# Patient Record
Sex: FEMALE | Race: WHITE | NOT HISPANIC OR LATINO
[De-identification: names, ages, dates, MRNs, and addresses within clinical notes are randomized per-mention and may not be internally consistent; named-entity substitution may affect disease eponyms.]

---

## 2017-03-05 ENCOUNTER — RESULT REVIEW (OUTPATIENT)
Age: 27
End: 2017-03-05

## 2018-02-14 ENCOUNTER — TRANSCRIPTION ENCOUNTER (OUTPATIENT)
Age: 28
End: 2018-02-14

## 2018-04-04 ENCOUNTER — RESULT REVIEW (OUTPATIENT)
Age: 28
End: 2018-04-04

## 2018-06-26 ENCOUNTER — APPOINTMENT (OUTPATIENT)
Dept: PSYCHIATRY | Facility: CLINIC | Age: 28
End: 2018-06-26

## 2019-04-03 ENCOUNTER — RESULT REVIEW (OUTPATIENT)
Age: 29
End: 2019-04-03

## 2020-12-22 ENCOUNTER — TRANSCRIPTION ENCOUNTER (OUTPATIENT)
Age: 30
End: 2020-12-22

## 2021-01-04 ENCOUNTER — RESULT REVIEW (OUTPATIENT)
Age: 31
End: 2021-01-04

## 2022-04-25 ENCOUNTER — RESULT REVIEW (OUTPATIENT)
Age: 32
End: 2022-04-25

## 2022-12-09 ENCOUNTER — ASOB RESULT (OUTPATIENT)
Age: 32
End: 2022-12-09

## 2022-12-09 ENCOUNTER — TRANSCRIPTION ENCOUNTER (OUTPATIENT)
Age: 32
End: 2022-12-09

## 2022-12-09 ENCOUNTER — APPOINTMENT (OUTPATIENT)
Dept: ANTEPARTUM | Facility: CLINIC | Age: 32
End: 2022-12-09

## 2022-12-09 PROCEDURE — 76811 OB US DETAILED SNGL FETUS: CPT

## 2023-03-31 ENCOUNTER — APPOINTMENT (OUTPATIENT)
Dept: ANTEPARTUM | Facility: CLINIC | Age: 33
End: 2023-03-31

## 2023-04-03 ENCOUNTER — OUTPATIENT (OUTPATIENT)
Dept: OUTPATIENT SERVICES | Facility: HOSPITAL | Age: 33
LOS: 1 days | End: 2023-04-03
Payer: COMMERCIAL

## 2023-04-03 VITALS
RESPIRATION RATE: 20 BRPM | OXYGEN SATURATION: 98 % | HEIGHT: 67 IN | DIASTOLIC BLOOD PRESSURE: 77 MMHG | HEART RATE: 87 BPM | SYSTOLIC BLOOD PRESSURE: 108 MMHG | WEIGHT: 162.92 LBS | TEMPERATURE: 98 F

## 2023-04-03 DIAGNOSIS — Z98.890 OTHER SPECIFIED POSTPROCEDURAL STATES: Chronic | ICD-10-CM

## 2023-04-03 DIAGNOSIS — Z01.818 ENCOUNTER FOR OTHER PREPROCEDURAL EXAMINATION: ICD-10-CM

## 2023-04-03 DIAGNOSIS — Z29.9 ENCOUNTER FOR PROPHYLACTIC MEASURES, UNSPECIFIED: ICD-10-CM

## 2023-04-03 LAB
BLD GP AB SCN SERPL QL: NEGATIVE — SIGNIFICANT CHANGE UP
HCT VFR BLD CALC: 35.8 % — SIGNIFICANT CHANGE UP (ref 34.5–45)
HGB BLD-MCNC: 12.1 G/DL — SIGNIFICANT CHANGE UP (ref 11.5–15.5)
MCHC RBC-ENTMCNC: 33.1 PG — SIGNIFICANT CHANGE UP (ref 27–34)
MCHC RBC-ENTMCNC: 33.8 GM/DL — SIGNIFICANT CHANGE UP (ref 32–36)
MCV RBC AUTO: 97.8 FL — SIGNIFICANT CHANGE UP (ref 80–100)
NRBC # BLD: 0 /100 WBCS — SIGNIFICANT CHANGE UP (ref 0–0)
PLATELET # BLD AUTO: 177 K/UL — SIGNIFICANT CHANGE UP (ref 150–400)
RBC # BLD: 3.66 M/UL — LOW (ref 3.8–5.2)
RBC # FLD: 12.3 % — SIGNIFICANT CHANGE UP (ref 10.3–14.5)
RH IG SCN BLD-IMP: POSITIVE — SIGNIFICANT CHANGE UP
WBC # BLD: 8.12 K/UL — SIGNIFICANT CHANGE UP (ref 3.8–10.5)
WBC # FLD AUTO: 8.12 K/UL — SIGNIFICANT CHANGE UP (ref 3.8–10.5)

## 2023-04-03 PROCEDURE — 86901 BLOOD TYPING SEROLOGIC RH(D): CPT

## 2023-04-03 PROCEDURE — 86850 RBC ANTIBODY SCREEN: CPT

## 2023-04-03 PROCEDURE — 86900 BLOOD TYPING SEROLOGIC ABO: CPT

## 2023-04-03 PROCEDURE — G0463: CPT

## 2023-04-03 PROCEDURE — 85027 COMPLETE CBC AUTOMATED: CPT

## 2023-04-03 RX ORDER — CEFAZOLIN SODIUM 1 G
2000 VIAL (EA) INJECTION ONCE
Refills: 0 | Status: COMPLETED | OUTPATIENT
Start: 2023-04-17 | End: 2023-04-17

## 2023-04-03 NOTE — OB PST NOTE - NSANTHOSAYNRD_GEN_A_CORE
No. MARTINEZ screening performed.  STOP BANG Legend: 0-2 = LOW Risk; 3-4 = INTERMEDIATE Risk; 5-8 = HIGH Risk

## 2023-04-03 NOTE — OB PST NOTE - PATIENT ON (OXYGEN DELIVERY METHOD)
As discussed in clinic today:    Lab work and Abdominal X-ray today: We will call you with the results   - Lab work: review after allergy results    - Abdominal X-ray is completed on the Lobby floor in this building at outpatient registration.      Medications:   Start taking IBD- Jaclyn- peppermint tea/oil: OTC  Start taking Bentyl- 10mg up to four times a day AS Needed      Stool study- fecal leon-protectin: checks for inflammation in the colon      BRAT diet  FODMAP handout    School note today room air

## 2023-04-03 NOTE — OB PST NOTE - PROBLEM SELECTOR PLAN 1
Primary  on 2023.  Chlorhexidine wash give Pre-Op  Instructed to drink Gatorade 20 oz , to be completed 2 hrs pre-op

## 2023-04-03 NOTE — OB PST NOTE - FALL HARM RISK - UNIVERSAL INTERVENTIONS
Bed in lowest position, wheels locked, appropriate side rails in place/Call bell, personal items and telephone in reach/Instruct patient to call for assistance before getting out of bed or chair/Non-slip footwear when patient is out of bed/Tarzan to call system/Physically safe environment - no spills, clutter or unnecessary equipment/Purposeful Proactive Rounding/Room/bathroom lighting operational, light cord in reach

## 2023-04-03 NOTE — OB PST NOTE - BSA (M2)
Medical screening exam completed.  I have conducted a focused provider triage encounter, findings are as follows:    Brief history of present illness:  Headache after basketball hit her in the head. No pmh.     Vitals:    04/14/22 1055   BP: (!) 147/65   BP Location: Right arm   Patient Position: Sitting   Pulse: (!) 58   Resp: 18   Temp: 98.1 °F (36.7 °C)   TempSrc: Oral   SpO2: 98%   Weight: 88.5 kg (195 lb)       Pertinent physical exam:  Ambulatry. na    Brief workup plan:  Meds. reassessment    Preliminary workup initiated; this workup will be continued and followed by the physician or advanced practice provider that is assigned to the patient when roomed.  
1.85

## 2023-04-03 NOTE — OB PST NOTE - ASSESSMENT
LORENZOI VTE 2.0 SCORE [CLOT updated 2019]    AGE RELATED RISK FACTORS                                                       MOBILITY RELATED FACTORS  [ ] Age 41-60 years                                            (1 Point)                    [ ] Bed rest                                                        (1 Point)  [ ] Age: 61-74 years                                           (2 Points)                  [ ] Plaster cast                                                   (2 Points)  [ ] Age= 75 years                                              (3 Points)                    [ ] Bed bound for more than 72 hours                 (2 Points)    DISEASE RELATED RISK FACTORS                                               GENDER SPECIFIC FACTORS  [ ] Edema in the lower extremities                       (1 Point)              [ ] Pregnancy                                                     (1 Point)  [ ] Varicose veins                                               (1 Point)                     [ ] Post-partum < 6 weeks                                   (1 Point)             [ ] BMI > 25 Kg/m2                                            (1 Point)                     [ ] Hormonal therapy  or oral contraception          (1 Point)                 [ ] Sepsis (in the previous month)                        (1 Point)               [ ] History of pregnancy complications                 (1 point)  [ ] Pneumonia or serious lung disease                                               [ ] Unexplained or recurrent                     (1 Point)           (in the previous month)                               (1 Point)  [ ] Abnormal pulmonary function test                     (1 Point)                 SURGERY RELATED RISK FACTORS  [ ] Acute myocardial infarction                              (1 Point)               [ ]  Section                                             (1 Point)  [ ] Congestive heart failure (in the previous month)  (1 Point)      [ ] Minor surgery                                                  (1 Point)   [ ] Inflammatory bowel disease                             (1 Point)               [ ] Arthroscopic surgery                                        (2 Points)  [ ] Central venous access                                      (2 Points)                [ ] General surgery lasting more than 45 minutes (2 points)  [ ] Malignancy- Present or previous                   (2 Points)                [ ] Elective arthroplasty                                         (5 points)    [ ] Stroke (in the previous month)                          (5 Points)                                                                                                                                                           HEMATOLOGY RELATED FACTORS                                                 TRAUMA RELATED RISK FACTORS  [ ] Prior episodes of VTE                                     (3 Points)                [ ] Fracture of the hip, pelvis, or leg                       (5 Points)  [ ] Positive family history for VTE                         (3 Points)             [ ] Acute spinal cord injury (in the previous month)  (5 Points)  [ ] Prothrombin 29009 A                                     (3 Points)               [ ] Paralysis  (less than 1 month)                             (5 Points)  [ ] Factor V Leiden                                             (3 Points)                  [ ] Multiple Trauma within 1 month                        (5 Points)  [ ] Lupus anticoagulants                                     (3 Points)                                                           [ ] Anticardiolipin antibodies                               (3 Points)                                                       [ ] High homocysteine in the blood                      (3 Points)                                             [ ] Other congenital or acquired thrombophilia      (3 Points)                                                [ ] Heparin induced thrombocytopenia                  (3 Points)                                     Total Score [          ] LORENZOI VTE 2.0 SCORE [CLOT updated 2019]    AGE RELATED RISK FACTORS                                                       MOBILITY RELATED FACTORS  [ ] Age 41-60 years                                            (1 Point)                    [ ] Bed rest                                                        (1 Point)  [ ] Age: 61-74 years                                           (2 Points)                  [ ] Plaster cast                                                   (2 Points)  [ ] Age= 75 years                                              (3 Points)                    [ ] Bed bound for more than 72 hours                 (2 Points)    DISEASE RELATED RISK FACTORS                                               GENDER SPECIFIC FACTORS  [ ] Edema in the lower extremities                       (1 Point)              [x ] Pregnancy                                                     (1 Point)  [x ] Varicose veins                                               (1 Point)                     [ ] Post-partum < 6 weeks                                   (1 Point)             [x ] BMI > 25 Kg/m2                                            (1 Point)                     [ ] Hormonal therapy  or oral contraception          (1 Point)                 [ ] Sepsis (in the previous month)                        (1 Point)               [ ] History of pregnancy complications                 (1 point)  [ ] Pneumonia or serious lung disease                                               [ ] Unexplained or recurrent                     (1 Point)           (in the previous month)                               (1 Point)  [ ] Abnormal pulmonary function test                     (1 Point)                 SURGERY RELATED RISK FACTORS  [ ] Acute myocardial infarction                              (1 Point)               [x ]  Section                                             (1 Point)  [ ] Congestive heart failure (in the previous month)  (1 Point)      [ ] Minor surgery                                                  (1 Point)   [ ] Inflammatory bowel disease                             (1 Point)               [ ] Arthroscopic surgery                                        (2 Points)  [ ] Central venous access                                      (2 Points)                [ ] General surgery lasting more than 45 minutes (2 points)  [ ] Malignancy- Present or previous                   (2 Points)                [ ] Elective arthroplasty                                         (5 points)    [ ] Stroke (in the previous month)                          (5 Points)                                                                                                                                                           HEMATOLOGY RELATED FACTORS                                                 TRAUMA RELATED RISK FACTORS  [ ] Prior episodes of VTE                                     (3 Points)                [ ] Fracture of the hip, pelvis, or leg                       (5 Points)  [ ] Positive family history for VTE                         (3 Points)             [ ] Acute spinal cord injury (in the previous month)  (5 Points)  [ ] Prothrombin 23894 A                                     (3 Points)               [ ] Paralysis  (less than 1 month)                             (5 Points)  [ ] Factor V Leiden                                             (3 Points)                  [ ] Multiple Trauma within 1 month                        (5 Points)  [ ] Lupus anticoagulants                                     (3 Points)                                                           [ ] Anticardiolipin antibodies                               (3 Points)                                                       [ ] High homocysteine in the blood                      (3 Points)                                             [ ] Other congenital or acquired thrombophilia      (3 Points)                                                [ ] Heparin induced thrombocytopenia                  (3 Points)                                     Total Score [      4    ]

## 2023-04-03 NOTE — OB PST NOTE - FALL HARM RISK - FALLEN IN PAST
Tetracycline Pregnancy And Lactation Text: This medication is Pregnancy Category D and not consider safe during pregnancy. It is also excreted in breast milk. Azelaic Acid Counseling: Patient counseled that medicine may cause skin irritation and to avoid applying near the eyes.  In the event of skin irritation, the patient was advised to reduce the amount of the drug applied or use it less frequently.   The patient verbalized understanding of the proper use and possible adverse effects of azelaic acid.  All of the patient's questions and concerns were addressed. Use Enhanced Medication Counseling?: No Winlevi Pregnancy And Lactation Text: This medication is considered safe during pregnancy and breastfeeding. Azelaic Acid Pregnancy And Lactation Text: This medication is considered safe during pregnancy and breast feeding. Sarecycline Counseling: Patient advised regarding possible photosensitivity and discoloration of the teeth, skin, lips, tongue and gums.  Patient instructed to avoid sunlight, if possible.  When exposed to sunlight, patients should wear protective clothing, sunglasses, and sunscreen.  The patient was instructed to call the office immediately if the following severe adverse effects occur:  hearing changes, easy bruising/bleeding, severe headache, or vision changes.  The patient verbalized understanding of the proper use and possible adverse effects of sarecycline.  All of the patient's questions and concerns were addressed. Erythromycin Counseling:  I discussed with the patient the risks of erythromycin including but not limited to GI upset, allergic reaction, drug rash, diarrhea, increase in liver enzymes, and yeast infections. Doxycycline Counseling:  Patient counseled regarding possible photosensitivity and increased risk for sunburn.  Patient instructed to avoid sunlight, if possible.  When exposed to sunlight, patients should wear protective clothing, sunglasses, and sunscreen.  The patient was instructed to call the office immediately if the following severe adverse effects occur:  hearing changes, easy bruising/bleeding, severe headache, or vision changes.  The patient verbalized understanding of the proper use and possible adverse effects of doxycycline.  All of the patient's questions and concerns were addressed. Topical Clindamycin Pregnancy And Lactation Text: This medication is Pregnancy Category B and is considered safe during pregnancy. It is unknown if it is excreted in breast milk. Tazorac Counseling:  Patient advised that medication is irritating and drying.  Patient may need to apply sparingly and wash off after an hour before eventually leaving it on overnight.  The patient verbalized understanding of the proper use and possible adverse effects of tazorac.  All of the patient's questions and concerns were addressed. Dapsone Counseling: I discussed with the patient the risks of dapsone including but not limited to hemolytic anemia, agranulocytosis, rashes, methemoglobinemia, kidney failure, peripheral neuropathy, headaches, GI upset, and liver toxicity.  Patients who start dapsone require monitoring including baseline LFTs and weekly CBCs for the first month, then every month thereafter.  The patient verbalized understanding of the proper use and possible adverse effects of dapsone.  All of the patient's questions and concerns were addressed. Topical Sulfur Applications Pregnancy And Lactation Text: This medication is Pregnancy Category C and has an unknown safety profile during pregnancy. It is unknown if this topical medication is excreted in breast milk. High Dose Vitamin A Pregnancy And Lactation Text: High dose vitamin A therapy is contraindicated during pregnancy and breast feeding. Detail Level: Detailed Spironolactone Counseling: Patient advised regarding risks of diarrhea, abdominal pain, hyperkalemia, birth defects (for female patients), liver toxicity and renal toxicity. The patient may need blood work to monitor liver and kidney function and potassium levels while on therapy. The patient verbalized understanding of the proper use and possible adverse effects of spironolactone.  All of the patient's questions and concerns were addressed. Topical Retinoid Pregnancy And Lactation Text: This medication is Pregnancy Category C. It is unknown if this medication is excreted in breast milk. Azithromycin Pregnancy And Lactation Text: This medication is considered safe during pregnancy and is also secreted in breast milk. Azithromycin Counseling:  I discussed with the patient the risks of azithromycin including but not limited to GI upset, allergic reaction, drug rash, diarrhea, and yeast infections. Isotretinoin Counseling: Patient should get monthly blood tests, not donate blood, not drive at night if vision affected, not share medication, and not undergo elective surgery for 6 months after tx completed. Side effects reviewed, pt to contact office should one occur. Birth Control Pills Counseling: Birth Control Pill Counseling: I discussed with the patient the potential side effects of OCPs including but not limited to increased risk of stroke, heart attack, thrombophlebitis, deep venous thrombosis, hepatic adenomas, breast changes, GI upset, headaches, and depression.  The patient verbalized understanding of the proper use and possible adverse effects of OCPs. All of the patient's questions and concerns were addressed. Isotretinoin Pregnancy And Lactation Text: This medication is Pregnancy Category X and is considered extremely dangerous during pregnancy. It is unknown if it is excreted in breast milk. Bactrim Pregnancy And Lactation Text: This medication is Pregnancy Category D and is known to cause fetal risk.  It is also excreted in breast milk. Benzoyl Peroxide Pregnancy And Lactation Text: This medication is Pregnancy Category C. It is unknown if benzoyl peroxide is excreted in breast milk. Dapsone Pregnancy And Lactation Text: This medication is Pregnancy Category C and is not considered safe during pregnancy or breast feeding. Topical Sulfur Applications Counseling: Topical Sulfur Counseling: Patient counseled that this medication may cause skin irritation or allergic reactions.  In the event of skin irritation, the patient was advised to reduce the amount of the drug applied or use it less frequently.   The patient verbalized understanding of the proper use and possible adverse effects of topical sulfur application.  All of the patient's questions and concerns were addressed. Birth Control Pills Pregnancy And Lactation Text: This medication should be avoided if pregnant and for the first 30 days post-partum. Benzoyl Peroxide Counseling: Patient counseled that medicine may cause skin irritation and bleach clothing.  In the event of skin irritation, the patient was advised to reduce the amount of the drug applied or use it less frequently.   The patient verbalized understanding of the proper use and possible adverse effects of benzoyl peroxide.  All of the patient's questions and concerns were addressed. Minocycline Counseling: Patient advised regarding possible photosensitivity and discoloration of the teeth, skin, lips, tongue and gums.  Patient instructed to avoid sunlight, if possible.  When exposed to sunlight, patients should wear protective clothing, sunglasses, and sunscreen.  The patient was instructed to call the office immediately if the following severe adverse effects occur:  hearing changes, easy bruising/bleeding, severe headache, or vision changes.  The patient verbalized understanding of the proper use and possible adverse effects of minocycline.  All of the patient's questions and concerns were addressed. Tazorac Pregnancy And Lactation Text: This medication is not safe during pregnancy. It is unknown if this medication is excreted in breast milk. Detail Level: Zone Erythromycin Pregnancy And Lactation Text: This medication is Pregnancy Category B and is considered safe during pregnancy. It is also excreted in breast milk. Bactrim Counseling:  I discussed with the patient the risks of sulfa antibiotics including but not limited to GI upset, allergic reaction, drug rash, diarrhea, dizziness, photosensitivity, and yeast infections.  Rarely, more serious reactions can occur including but not limited to aplastic anemia, agranulocytosis, methemoglobinemia, blood dyscrasias, liver or kidney failure, lung infiltrates or desquamative/blistering drug rashes. Spironolactone Pregnancy And Lactation Text: This medication can cause feminization of the male fetus and should be avoided during pregnancy. The active metabolite is also found in breast milk. Doxycycline Pregnancy And Lactation Text: This medication is Pregnancy Category D and not consider safe during pregnancy. It is also excreted in breast milk but is considered safe for shorter treatment courses. Winlevi Counseling:  I discussed with the patient the risks of topical clascoterone including but not limited to erythema, scaling, itching, and stinging. Patient voiced their understanding. High Dose Vitamin A Counseling: Side effects reviewed, pt to contact office should one occur. Topical Retinoid counseling:  Patient advised to apply a pea-sized amount only at bedtime and wait 30 minutes after washing their face before applying.  If too drying, patient may add a non-comedogenic moisturizer. The patient verbalized understanding of the proper use and possible adverse effects of retinoids.  All of the patient's questions and concerns were addressed. Topical Clindamycin Counseling: Patient counseled that this medication may cause skin irritation or allergic reactions.  In the event of skin irritation, the patient was advised to reduce the amount of the drug applied or use it less frequently.   The patient verbalized understanding of the proper use and possible adverse effects of clindamycin.  All of the patient's questions and concerns were addressed. Tetracycline Counseling: Patient counseled regarding possible photosensitivity and increased risk for sunburn.  Patient instructed to avoid sunlight, if possible.  When exposed to sunlight, patients should wear protective clothing, sunglasses, and sunscreen.  The patient was instructed to call the office immediately if the following severe adverse effects occur:  hearing changes, easy bruising/bleeding, severe headache, or vision changes.  The patient verbalized understanding of the proper use and possible adverse effects of tetracycline.  All of the patient's questions and concerns were addressed. Patient understands to avoid pregnancy while on therapy due to potential birth defects. No

## 2023-04-03 NOTE — OB PST NOTE - NSICDXPASTMEDICALHX_GEN_ALL_CORE_FT
PAST MEDICAL HISTORY:  History of HPV infection      PAST MEDICAL HISTORY:  Genetic carrier status     History of HPV infection

## 2023-04-03 NOTE — OB PST NOTE - HISTORY OF PRESENT ILLNESS
33 yr old female ,  , 37 weeks pregnant with breech presentation, Coming in for Primary  on 2023.    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 Farah 3/28/2023, no contraindications with neuraxial anesthesia , note in chart .     Denies Recent travel, Exposure or Covid symptoms  Covid test- 2023

## 2023-04-14 ENCOUNTER — OUTPATIENT (OUTPATIENT)
Dept: OUTPATIENT SERVICES | Facility: HOSPITAL | Age: 33
LOS: 1 days | End: 2023-04-14
Payer: COMMERCIAL

## 2023-04-14 DIAGNOSIS — Z11.52 ENCOUNTER FOR SCREENING FOR COVID-19: ICD-10-CM

## 2023-04-14 DIAGNOSIS — Z98.890 OTHER SPECIFIED POSTPROCEDURAL STATES: Chronic | ICD-10-CM

## 2023-04-14 LAB — SARS-COV-2 RNA SPEC QL NAA+PROBE: SIGNIFICANT CHANGE UP

## 2023-04-14 PROCEDURE — U0005: CPT

## 2023-04-14 PROCEDURE — C9803: CPT

## 2023-04-14 PROCEDURE — U0003: CPT

## 2023-04-16 ENCOUNTER — TRANSCRIPTION ENCOUNTER (OUTPATIENT)
Age: 33
End: 2023-04-16

## 2023-04-17 ENCOUNTER — INPATIENT (INPATIENT)
Facility: HOSPITAL | Age: 33
LOS: 1 days | Discharge: HOME | End: 2023-04-19
Attending: OBSTETRICS & GYNECOLOGY | Admitting: OBSTETRICS & GYNECOLOGY
Payer: COMMERCIAL

## 2023-04-17 ENCOUNTER — TRANSCRIPTION ENCOUNTER (OUTPATIENT)
Age: 33
End: 2023-04-17

## 2023-04-17 VITALS
HEIGHT: 67 IN | DIASTOLIC BLOOD PRESSURE: 86 MMHG | HEART RATE: 92 BPM | RESPIRATION RATE: 18 BRPM | SYSTOLIC BLOOD PRESSURE: 117 MMHG | TEMPERATURE: 97 F | WEIGHT: 162.92 LBS

## 2023-04-17 DIAGNOSIS — Z98.890 OTHER SPECIFIED POSTPROCEDURAL STATES: Chronic | ICD-10-CM

## 2023-04-17 LAB
BASOPHILS # BLD AUTO: 0.02 K/UL — SIGNIFICANT CHANGE UP (ref 0–0.2)
BASOPHILS NFR BLD AUTO: 0.3 % — SIGNIFICANT CHANGE UP (ref 0–2)
COVID-19 SPIKE DOMAIN AB INTERP: POSITIVE
COVID-19 SPIKE DOMAIN ANTIBODY RESULT: >250 U/ML — HIGH
EOSINOPHIL # BLD AUTO: 0.06 K/UL — SIGNIFICANT CHANGE UP (ref 0–0.5)
EOSINOPHIL NFR BLD AUTO: 0.8 % — SIGNIFICANT CHANGE UP (ref 0–6)
HCT VFR BLD CALC: 37.5 % — SIGNIFICANT CHANGE UP (ref 34.5–45)
HGB BLD-MCNC: 13.1 G/DL — SIGNIFICANT CHANGE UP (ref 11.5–15.5)
IMM GRANULOCYTES NFR BLD AUTO: 0.5 % — SIGNIFICANT CHANGE UP (ref 0–0.9)
LYMPHOCYTES # BLD AUTO: 1.78 K/UL — SIGNIFICANT CHANGE UP (ref 1–3.3)
LYMPHOCYTES # BLD AUTO: 23.6 % — SIGNIFICANT CHANGE UP (ref 13–44)
MCHC RBC-ENTMCNC: 34 PG — SIGNIFICANT CHANGE UP (ref 27–34)
MCHC RBC-ENTMCNC: 34.9 GM/DL — SIGNIFICANT CHANGE UP (ref 32–36)
MCV RBC AUTO: 97.4 FL — SIGNIFICANT CHANGE UP (ref 80–100)
MONOCYTES # BLD AUTO: 0.53 K/UL — SIGNIFICANT CHANGE UP (ref 0–0.9)
MONOCYTES NFR BLD AUTO: 7 % — SIGNIFICANT CHANGE UP (ref 2–14)
NEUTROPHILS # BLD AUTO: 5.1 K/UL — SIGNIFICANT CHANGE UP (ref 1.8–7.4)
NEUTROPHILS NFR BLD AUTO: 67.8 % — SIGNIFICANT CHANGE UP (ref 43–77)
NRBC # BLD: 0 /100 WBCS — SIGNIFICANT CHANGE UP (ref 0–0)
PLATELET # BLD AUTO: 158 K/UL — SIGNIFICANT CHANGE UP (ref 150–400)
RBC # BLD: 3.85 M/UL — SIGNIFICANT CHANGE UP (ref 3.8–5.2)
RBC # FLD: 12.2 % — SIGNIFICANT CHANGE UP (ref 10.3–14.5)
SARS-COV-2 IGG+IGM SERPL QL IA: >250 U/ML — HIGH
SARS-COV-2 IGG+IGM SERPL QL IA: POSITIVE
T PALLIDUM AB TITR SER: NEGATIVE — SIGNIFICANT CHANGE UP
WBC # BLD: 7.53 K/UL — SIGNIFICANT CHANGE UP (ref 3.8–10.5)
WBC # FLD AUTO: 7.53 K/UL — SIGNIFICANT CHANGE UP (ref 3.8–10.5)

## 2023-04-17 PROCEDURE — 88304 TISSUE EXAM BY PATHOLOGIST: CPT | Mod: 26

## 2023-04-17 DEVICE — SURGICEL POWDER 3 GRAMS: Type: IMPLANTABLE DEVICE | Status: FUNCTIONAL

## 2023-04-17 RX ORDER — OXYCODONE HYDROCHLORIDE 5 MG/1
5 TABLET ORAL
Refills: 0 | Status: DISCONTINUED | OUTPATIENT
Start: 2023-04-17 | End: 2023-04-18

## 2023-04-17 RX ORDER — OXYCODONE HYDROCHLORIDE 5 MG/1
5 TABLET ORAL
Refills: 0 | Status: COMPLETED | OUTPATIENT
Start: 2023-04-17 | End: 2023-04-24

## 2023-04-17 RX ORDER — SIMETHICONE 80 MG/1
80 TABLET, CHEWABLE ORAL EVERY 4 HOURS
Refills: 0 | Status: DISCONTINUED | OUTPATIENT
Start: 2023-04-17 | End: 2023-04-19

## 2023-04-17 RX ORDER — DIPHENHYDRAMINE HCL 50 MG
25 CAPSULE ORAL EVERY 6 HOURS
Refills: 0 | Status: DISCONTINUED | OUTPATIENT
Start: 2023-04-17 | End: 2023-04-19

## 2023-04-17 RX ORDER — NALBUPHINE HYDROCHLORIDE 10 MG/ML
2.5 INJECTION, SOLUTION INTRAMUSCULAR; INTRAVENOUS; SUBCUTANEOUS EVERY 6 HOURS
Refills: 0 | Status: DISCONTINUED | OUTPATIENT
Start: 2023-04-17 | End: 2023-04-18

## 2023-04-17 RX ORDER — OXYCODONE HYDROCHLORIDE 5 MG/1
5 TABLET ORAL ONCE
Refills: 0 | Status: DISCONTINUED | OUTPATIENT
Start: 2023-04-17 | End: 2023-04-19

## 2023-04-17 RX ORDER — LANOLIN
1 OINTMENT (GRAM) TOPICAL EVERY 6 HOURS
Refills: 0 | Status: DISCONTINUED | OUTPATIENT
Start: 2023-04-17 | End: 2023-04-19

## 2023-04-17 RX ORDER — SODIUM CHLORIDE 9 MG/ML
1000 INJECTION, SOLUTION INTRAVENOUS
Refills: 0 | Status: DISCONTINUED | OUTPATIENT
Start: 2023-04-17 | End: 2023-04-17

## 2023-04-17 RX ORDER — OXYTOCIN 10 UNIT/ML
333.33 VIAL (ML) INJECTION
Qty: 20 | Refills: 0 | Status: DISCONTINUED | OUTPATIENT
Start: 2023-04-17 | End: 2023-04-17

## 2023-04-17 RX ORDER — NALOXONE HYDROCHLORIDE 4 MG/.1ML
0.1 SPRAY NASAL
Refills: 0 | Status: DISCONTINUED | OUTPATIENT
Start: 2023-04-17 | End: 2023-04-18

## 2023-04-17 RX ORDER — DEXAMETHASONE 0.5 MG/5ML
4 ELIXIR ORAL EVERY 6 HOURS
Refills: 0 | Status: DISCONTINUED | OUTPATIENT
Start: 2023-04-17 | End: 2023-04-18

## 2023-04-17 RX ORDER — FAMOTIDINE 10 MG/ML
20 INJECTION INTRAVENOUS ONCE
Refills: 0 | Status: COMPLETED | OUTPATIENT
Start: 2023-04-17 | End: 2023-04-17

## 2023-04-17 RX ORDER — SODIUM CHLORIDE 9 MG/ML
1000 INJECTION, SOLUTION INTRAVENOUS
Refills: 0 | Status: DISCONTINUED | OUTPATIENT
Start: 2023-04-17 | End: 2023-04-19

## 2023-04-17 RX ORDER — ACETAMINOPHEN 500 MG
975 TABLET ORAL
Refills: 0 | Status: DISCONTINUED | OUTPATIENT
Start: 2023-04-17 | End: 2023-04-19

## 2023-04-17 RX ORDER — HEPARIN SODIUM 5000 [USP'U]/ML
5000 INJECTION INTRAVENOUS; SUBCUTANEOUS EVERY 12 HOURS
Refills: 0 | Status: DISCONTINUED | OUTPATIENT
Start: 2023-04-17 | End: 2023-04-19

## 2023-04-17 RX ORDER — KETOROLAC TROMETHAMINE 30 MG/ML
30 SYRINGE (ML) INJECTION EVERY 6 HOURS
Refills: 0 | Status: DISCONTINUED | OUTPATIENT
Start: 2023-04-17 | End: 2023-04-18

## 2023-04-17 RX ORDER — ONDANSETRON 8 MG/1
4 TABLET, FILM COATED ORAL EVERY 6 HOURS
Refills: 0 | Status: DISCONTINUED | OUTPATIENT
Start: 2023-04-17 | End: 2023-04-18

## 2023-04-17 RX ORDER — CHLORHEXIDINE GLUCONATE 213 G/1000ML
1 SOLUTION TOPICAL ONCE
Refills: 0 | Status: COMPLETED | OUTPATIENT
Start: 2023-04-17 | End: 2023-04-17

## 2023-04-17 RX ORDER — SODIUM CHLORIDE 9 MG/ML
1000 INJECTION, SOLUTION INTRAVENOUS ONCE
Refills: 0 | Status: COMPLETED | OUTPATIENT
Start: 2023-04-17 | End: 2023-04-17

## 2023-04-17 RX ORDER — MORPHINE SULFATE 50 MG/1
0.1 CAPSULE, EXTENDED RELEASE ORAL ONCE
Refills: 0 | Status: DISCONTINUED | OUTPATIENT
Start: 2023-04-17 | End: 2023-04-18

## 2023-04-17 RX ORDER — OXYCODONE HYDROCHLORIDE 5 MG/1
10 TABLET ORAL
Refills: 0 | Status: DISCONTINUED | OUTPATIENT
Start: 2023-04-17 | End: 2023-04-18

## 2023-04-17 RX ORDER — TETANUS TOXOID, REDUCED DIPHTHERIA TOXOID AND ACELLULAR PERTUSSIS VACCINE, ADSORBED 5; 2.5; 8; 8; 2.5 [IU]/.5ML; [IU]/.5ML; UG/.5ML; UG/.5ML; UG/.5ML
0.5 SUSPENSION INTRAMUSCULAR ONCE
Refills: 0 | Status: DISCONTINUED | OUTPATIENT
Start: 2023-04-17 | End: 2023-04-19

## 2023-04-17 RX ORDER — IBUPROFEN 200 MG
600 TABLET ORAL EVERY 6 HOURS
Refills: 0 | Status: COMPLETED | OUTPATIENT
Start: 2023-04-17 | End: 2024-03-15

## 2023-04-17 RX ORDER — CITRIC ACID/SODIUM CITRATE 300-500 MG
15 SOLUTION, ORAL ORAL ONCE
Refills: 0 | Status: COMPLETED | OUTPATIENT
Start: 2023-04-17 | End: 2023-04-17

## 2023-04-17 RX ORDER — MAGNESIUM HYDROXIDE 400 MG/1
30 TABLET, CHEWABLE ORAL
Refills: 0 | Status: DISCONTINUED | OUTPATIENT
Start: 2023-04-17 | End: 2023-04-19

## 2023-04-17 RX ADMIN — Medication 1 TABLET(S): at 13:51

## 2023-04-17 RX ADMIN — SODIUM CHLORIDE 2000 MILLILITER(S): 9 INJECTION, SOLUTION INTRAVENOUS at 06:10

## 2023-04-17 RX ADMIN — Medication 30 MILLIGRAM(S): at 14:59

## 2023-04-17 RX ADMIN — HEPARIN SODIUM 5000 UNIT(S): 5000 INJECTION INTRAVENOUS; SUBCUTANEOUS at 17:54

## 2023-04-17 RX ADMIN — Medication 100 MILLIGRAM(S): at 08:00

## 2023-04-17 RX ADMIN — Medication 30 MILLIGRAM(S): at 20:07

## 2023-04-17 RX ADMIN — Medication 30 MILLIGRAM(S): at 20:37

## 2023-04-17 RX ADMIN — Medication 975 MILLIGRAM(S): at 23:54

## 2023-04-17 RX ADMIN — FAMOTIDINE 20 MILLIGRAM(S): 10 INJECTION INTRAVENOUS at 07:19

## 2023-04-17 RX ADMIN — Medication 15 MILLILITER(S): at 07:18

## 2023-04-17 RX ADMIN — CHLORHEXIDINE GLUCONATE 1 APPLICATION(S): 213 SOLUTION TOPICAL at 07:19

## 2023-04-17 RX ADMIN — Medication 30 MILLIGRAM(S): at 09:00

## 2023-04-17 RX ADMIN — SODIUM CHLORIDE 125 MILLILITER(S): 9 INJECTION, SOLUTION INTRAVENOUS at 11:25

## 2023-04-17 RX ADMIN — Medication 30 MILLIGRAM(S): at 15:30

## 2023-04-17 RX ADMIN — Medication 975 MILLIGRAM(S): at 17:54

## 2023-04-17 RX ADMIN — Medication 975 MILLIGRAM(S): at 23:24

## 2023-04-17 NOTE — OB RN PATIENT PROFILE - NS_OBGYNHISTORY_OBGYN_ALL_OB_FT
No significant OB history   history of HPV - Colposcopy in past    Pt has been having fluid monitored, SIOBHAN 7 for last 6 weeks

## 2023-04-17 NOTE — OB RN INTRAOPERATIVE NOTE - NSSELHIDDEN_OBGYN_ALL_OB_FT
[NS_DeliveryAttending1_OBGYN_ALL_OB_FT:[id number]],[NS_DeliveryAssist1_OBGYN_ALL_OB_FT:[id number]]

## 2023-04-17 NOTE — DISCHARGE NOTE OB - HOSPITAL COURSE
Patient admitted  for  section  and had an uncomplicated  delivery.  Patient had an unremarkable postoperative course and was stable for discharge home on postoperative day 2.

## 2023-04-17 NOTE — OB PROVIDER H&P - HISTORY OF PRESENT ILLNESS
32 yo  at 39w3d presents for scheduled pLTCS 2/2 breech presentation. +FM. -LOF. -CTXs. -VB. Pt denies any other concerns.    – PNC: Denies prenatal issues. GBS +.  EFW 3100g by [facility]  – OBHx:    early SABx1 in   – GynHx: denies  – PMH: denies  – PSH: denies  – Psych: Hx of anxiety/depression, on meds from 2017 - 2019. Currently feels well. Denies s/s of depression/anxiety   – Social: denies   – Meds: PNV   – Allergies: NKDA  – Will accept blood transfusions? Yes

## 2023-04-17 NOTE — DISCHARGE NOTE OB - CARE PROVIDER_API CALL
Holly Mccracken)  Obstetrics and Gynecology  [address], [address]  [place] [address]  Phone: [phone]  Fax: [phone]  Follow Up Time: 2 weeks

## 2023-04-17 NOTE — DISCHARGE NOTE OB - NSDCQMCOGNITION_NEU_ALL_CORE
Patient did not read Susan message.  Please call to review information and let me know if they have any questions    Cholesterol high, is she not taking?   No difficulties

## 2023-04-17 NOTE — OB PROVIDER H&P - ASSESSMENT
32 yo  at 39w3d GA admitted for scheduled pLTCS 2/2 breech presentation. PNC uncomplicated. GBS +    Plan  - Admit to LND. Routine pre-op Labs. IVF.  - T&S  - Fetus: Reactive tracing. Breech. EFW 3100g by [facility].   - Peds present at time of delivery for breech   - Prenatal issues: breech presentation   - GBS positive   - Spinal for regional anesthesia    Patient discussed with attending physician, Dr. Nawaf Yang MD PGY2

## 2023-04-17 NOTE — OB NEONATOLOGY/PEDIATRICIAN DELIVERY SUMMARY - NSPEDSNEONOTESA_OBGYN_ALL_OB_FT
Requested to attend CS delivery due to breech presentation. Mother is a  33yo  at  39.3weeks of gestation. Prenatal labs O+, negative/NR/immune. GBS unknown no rupture no labor. Maternal PMHx: unremarkable. Prenatal Care uncomplicated. ROM at delivery, clear fluid. Delivery by primary CS, breech presentation. Emerged vigorous. Delayed cord clamping for 30 seconds. Warmed, dried, stimulated and suctioned. APGAR 8/9 .   Mother wants breast feed, desires HepB vaccine. Requested to attend CS delivery due to breech presentation. Mother is a  31yo  at  39.3weeks of gestation. Prenatal labs O+, negative/NR/non-immune. GBS unknown no rupture no labor. Maternal PMHx: unremarkable. Prenatal Care uncomplicated. ROM at delivery, clear fluid. Delivery by primary CS, breech presentation. Emerged vigorous. Delayed cord clamping for 30 seconds. Warmed, dried, stimulated and suctioned. APGAR 8/9 .   Mother wants breast feed, desires HepB vaccine.

## 2023-04-17 NOTE — DISCHARGE NOTE OB - PATIENT PORTAL LINK FT
You can access the FollowMyHealth Patient Portal offered by [facility] by registering at the following website: [email or web address]. By joining [employer]’s FollowMyHealth portal, you will also be able to view your health information using other applications (apps) compatible with our system.

## 2023-04-17 NOTE — OB PROVIDER H&P - NSHPPHYSICALEXAM_GEN_ALL_CORE
Objective  – Vital Signs  ICU Vital Signs Last 24 Hrs  T(C): 36.3 (17 Apr 2023 06:03), Max: 36.3 (17 Apr 2023 06:03)  T(F): 97.3 (17 Apr 2023 06:03), Max: 97.3 (17 Apr 2023 06:03)  HR: 88 (17 Apr 2023 07:15) (70 - 92)  BP: 117/86 (17 Apr 2023 06:07) (117/86 - 117/86)  BP(mean): --  ABP: --  ABP(mean): --  RR: 18 (17 Apr 2023 06:03) (18 - 18)  SpO2: 89% (17 Apr 2023 07:15) (89% - 100%)    O2 Parameters below as of 17 Apr 2023 06:03  Patient On (Oxygen Delivery Method): room air    – PE:   CV: RRR  Pulm: breathing comfortably on RA  Abd: gravid, nontender  Extr: moving all extremities with ease  – FHT: baseline 135, mod variability, +accels, -decels  – Tranquillity: not carleen  – EFW: 3100g by Leopold's   – Sono: Idris breech, head on maternal maternal LUQ

## 2023-04-17 NOTE — OB PROVIDER DELIVERY SUMMARY - NSPROVIDERDELIVERYNOTE_OBGYN_ALL_OB_FT
viable female infant, breech presentation, weight 7#1, APGARS 8/9  grossly normal uterus, b/l tubes and ovaries  approx 1cm simple appearing R. paratubal cyst, removed using cautery and sent to pathology   hysterotomy closed in 1 layer. Surgicell powder placed over hysterotomy   rectus muscle reapproximated with vicryl suture    841/1400/400    Dictation #: viable female infant, breech presentation, weight 7#1, APGARS 8/9  grossly normal uterus, b/l tubes and ovaries  approx 1cm simple appearing R. paratubal cyst, removed using cautery and sent to pathology   hysterotomy closed in 1 layer. Surgicell powder placed over hysterotomy   rectus muscle reapproximated with vicryl suture    841/1400/400    Dictation #: [id number]

## 2023-04-17 NOTE — OB PROVIDER H&P - ATTENDING COMMENTS
OB attending     patient seen and examined, agree with above    primary  section today for breech, consent signed, all questions answered. risk of bleeding infection and damage to surrounding organs.      Holly Mccracken MD

## 2023-04-17 NOTE — PRE-ANESTHESIA EVALUATION ADULT - NSANTHAIRWAYFT_ENT_ALL_CORE
thyromental distance greater than 6 cm  adequate mouth opening   adequate neck range of motion
FROM neck  TMD>3FB

## 2023-04-17 NOTE — OB PROVIDER DELIVERY SUMMARY - NSSELHIDDEN_OBGYN_ALL_OB_FT
[NS_DeliveryAttending1_OBGYN_ALL_OB_FT:[id number]=],[NS_DeliveryAssist1_OBGYN_ALL_OB_FT:[id number]=]

## 2023-04-17 NOTE — OB RN PATIENT PROFILE - NAME OF FATHER, OB PROFILE
"HPI    No specific concerns today. Presenting to establish care.     Past Medical History  Medical conditions -  None, h/o bleeding ulcer  Surgeries - bleeding ulcer cauterized via EGD ~ 15 years ago and required pRBC transfusion   Medications - none  Hospitalizations - for bleeding ulcer per above 15 years ago (4 nights)  Allergies - NKDA   Prior PCP - never had one  Last outpatient PCP visit - last doctor's visit was 15 years ago when he went to the hospital via ambulance for his bleeding ulcer    Family History  Mom - Alzheimer's, currently living in a nursing home @ age 90  Dad - CAD (stent, CABG)  Siblings - (3 sisters, 2 brothers)    Older brother - colon cancer (diagnosed at 57 years of age)  Children - (2 boys)   Youngest son - hepatitis C    Social History  Works as:  at [employer]   ADL: Indepndent  Ambulation: Independent   Lives with: Lives with wife in his own condo   Smokin/3 ppd, 40 years (13 pack year history)  Alcohol: social consumption   Illicit drug use: none   Diet: fruits, vegetables, fish, chicken, very little red meat, \"very, very, very seldom fast food\"  Exercise: walks a lot for work, ~ 5 - 10 miles/day     OBJECTIVE    Vitals  BP (!) 209/105  Pulse 93  Temp 98  F (36.7  C) (Oral)  Resp 16  Wt 190 lb 9.6 oz (86.5 kg)  SpO2 98%  BMI 30.76 kg/m2      Physical Exam  General: No acute distress  Ears: canals patent, TM within normal limits  Eyes: EOMI, PERRLA  Nose: nasal mucosa moist, no rhinorrhea  Oral cavity: moist mucosa, no tonsillar exudates, no oropharyngeal erythema/swelling  Neck: good ROM, supple, no apparent tracheal deviation  Respiratory: CTA bilaterally, no wheezes/rhonchi/rhales appreciated, no respiratory distress  Chest wall: No chest wall tenderness  Back: no paraspinal tenderness, no spinal tenderness, no vertebral step offs appreciated  Abdomen: soft, non-distended, non-tender, normoactive bowel sounds  Extremities: no cyanosis, no edema, " capillary refill <2 seconds, well perfused  Neuro: no focal deficits noted, reflexes within normal limits  Psyche: appropriate affect     ASSESSMENT/PLAN    # Benign Essential Hypertension  - lisinopril 10 mg daily  - DME for BP instrument + cuff  - check BP once a day     - record BP + date and time  - return in 2 weeks for BMP check, BP record review, and possible med dose adjustment  - potential side effects of lisinopril reviewed    # Health Maintenance  - referral for screening colonoscopy placed    #Anemia  - macrocytic  - B12 + folate level pending    #Tobacco use  - 1/3 ppd  - patient motivated to quit, but would like discuss at next appointment     Precepted with Dr. Birmingham    Silvestre Martin

## 2023-04-17 NOTE — OB RN DELIVERY SUMMARY - NS_SEPSISRSKCALC_OBGYN_ALL_OB_FT
EOS calculated successfully. EOS Risk Factor: 0.5/1000 live births ([facility] national incidence); GA=39w3d; Temp=97.88; ROM=0.033; GBS='Unknown'; Antibiotics='No antibiotics or any antibiotics < 2 hrs prior to birth'

## 2023-04-17 NOTE — DISCHARGE NOTE OB - MEDICATION SUMMARY - MEDICATIONS TO TAKE
I will START or STAY ON the medications listed below when I get home from the hospital:    ibuprofen 600 mg oral tablet  -- 1 tab(s) by mouth every 6 hours  -- Indication: For pain    acetaminophen 325 mg oral tablet  -- 2 tab(s) by mouth every 6 hours  -- Indication: For pain    oxyCODONE 5 mg oral tablet  -- 1 tab(s) by mouth every 4 to 6 hours as needed for Moderate to Severe Pain (4-10)  -- Indication: For pain    Prenatal Multivitamins with Folic Acid 1 mg oral tablet  -- 1 tab(s) by mouth once a day  -- Indication: For Health maintenance

## 2023-04-17 NOTE — OB PROVIDER H&P - NSLOWPPHRISK_OBGYN_A_OB
No previous uterine incision/Farmer Pregnancy/Less than or equal to 4 previous vaginal births/No known bleeding disorder/No history of postpartum hemorrhage/No other PPH risks indicated

## 2023-04-17 NOTE — DISCHARGE NOTE OB - NS MD DC FALL RISK RISK
For information on Fall & Injury Prevention, visit: [email or web address] OR  [email or web address] OR  https://www.cdc.gov/steadi/patient.html

## 2023-04-17 NOTE — OB RN DELIVERY SUMMARY - NSSELHIDDEN_OBGYN_ALL_OB_FT
[NS_DeliveryAttending1_OBGYN_ALL_OB_FT:[id number]],[NS_DeliveryAssist1_OBGYN_ALL_OB_FT:[id number]=]

## 2023-04-17 NOTE — OB NEONATOLOGY/PEDIATRICIAN DELIVERY SUMMARY - NSDELIVERYTYPEA_OBGYN_ALL_OB
Health Maintenance Due   Topic Date Due   • HPV Vaccine (1 - 2-dose series) Never done   • COVID-19 Vaccine (1) Never done       Patient is due for topics as listed above but is not proceeding with Immunization(s) HPV at this time.   Declines COVID 19 vaccine.            
History of Present Illness:  Brittanie is a 27 year old female,      , seen today for   1. Postop check    .  She returns 2 week(s) after a single incision robotic assist hysterectomy on 21.  She denies fever, chills, nausea or vomiting.  She has good pain control.  We did review pathology which was benign.    I have reviewed the patient's vital signs, medications and allergies, past medical, surgical, social and family history, updating these as appropriate.  See Histories section of the EMR for a display of this information.    PHYSICAL EXAM:    VITALS: Blood pressure 120/78, height 5' 5\" (1.651 m), weight 62.1 kg, last menstrual period 2021, not currently breastfeeding.  GENERAL: No acute distress.  ABDOMEN: Benign, Soft, non-tender, non-distended, No masses, organomegaly, hernias, bilaterally.  INCISION:  Clean, dry and intact.  EXTREMITIES: Normal and no edema.    ASSESSMENT:    1. Postop check        PLAN:    Return in about 4 weeks (around 2021).  
 Delivery

## 2023-04-17 NOTE — DISCHARGE NOTE OB - MATERIALS PROVIDED
Vaccinations/[facility]  Screening Program/  Immunization Record/Breastfeeding Log/Bottle Feeding Log/Breastfeeding Mother’s Support Group Information/Guide to Postpartum Care/[facility] Hearing Screen Program/Back To Sleep Handout/Shaken Baby Prevention Handout/Breastfeeding Guide and Packet/Birth Certificate Instructions

## 2023-04-18 LAB
BASOPHILS # BLD AUTO: 0.01 K/UL — SIGNIFICANT CHANGE UP (ref 0–0.2)
BASOPHILS NFR BLD AUTO: 0.1 % — SIGNIFICANT CHANGE UP (ref 0–2)
EOSINOPHIL # BLD AUTO: 0.03 K/UL — SIGNIFICANT CHANGE UP (ref 0–0.5)
EOSINOPHIL NFR BLD AUTO: 0.3 % — SIGNIFICANT CHANGE UP (ref 0–6)
HCT VFR BLD CALC: 27.4 % — LOW (ref 34.5–45)
HGB BLD-MCNC: 9.6 G/DL — LOW (ref 11.5–15.5)
IMM GRANULOCYTES NFR BLD AUTO: 0.5 % — SIGNIFICANT CHANGE UP (ref 0–0.9)
LYMPHOCYTES # BLD AUTO: 1.61 K/UL — SIGNIFICANT CHANGE UP (ref 1–3.3)
LYMPHOCYTES # BLD AUTO: 18.5 % — SIGNIFICANT CHANGE UP (ref 13–44)
MCHC RBC-ENTMCNC: 34.2 PG — HIGH (ref 27–34)
MCHC RBC-ENTMCNC: 35 GM/DL — SIGNIFICANT CHANGE UP (ref 32–36)
MCV RBC AUTO: 97.5 FL — SIGNIFICANT CHANGE UP (ref 80–100)
MONOCYTES # BLD AUTO: 0.66 K/UL — SIGNIFICANT CHANGE UP (ref 0–0.9)
MONOCYTES NFR BLD AUTO: 7.6 % — SIGNIFICANT CHANGE UP (ref 2–14)
NEUTROPHILS # BLD AUTO: 6.34 K/UL — SIGNIFICANT CHANGE UP (ref 1.8–7.4)
NEUTROPHILS NFR BLD AUTO: 73 % — SIGNIFICANT CHANGE UP (ref 43–77)
NRBC # BLD: 0 /100 WBCS — SIGNIFICANT CHANGE UP (ref 0–0)
PLATELET # BLD AUTO: 116 K/UL — LOW (ref 150–400)
RBC # BLD: 2.81 M/UL — LOW (ref 3.8–5.2)
RBC # FLD: 12 % — SIGNIFICANT CHANGE UP (ref 10.3–14.5)
WBC # BLD: 8.69 K/UL — SIGNIFICANT CHANGE UP (ref 3.8–10.5)
WBC # FLD AUTO: 8.69 K/UL — SIGNIFICANT CHANGE UP (ref 3.8–10.5)

## 2023-04-18 RX ORDER — OXYCODONE HYDROCHLORIDE 5 MG/1
5 TABLET ORAL
Refills: 0 | Status: DISCONTINUED | OUTPATIENT
Start: 2023-04-18 | End: 2023-04-19

## 2023-04-18 RX ORDER — FERROUS SULFATE 325(65) MG
325 TABLET ORAL
Refills: 0 | Status: DISCONTINUED | OUTPATIENT
Start: 2023-04-18 | End: 2023-04-19

## 2023-04-18 RX ORDER — IBUPROFEN 200 MG
600 TABLET ORAL EVERY 6 HOURS
Refills: 0 | Status: DISCONTINUED | OUTPATIENT
Start: 2023-04-18 | End: 2023-04-19

## 2023-04-18 RX ORDER — SENNA PLUS 8.6 MG/1
1 TABLET ORAL DAILY
Refills: 0 | Status: DISCONTINUED | OUTPATIENT
Start: 2023-04-18 | End: 2023-04-19

## 2023-04-18 RX ORDER — ASCORBIC ACID 60 MG
500 TABLET,CHEWABLE ORAL DAILY
Refills: 0 | Status: DISCONTINUED | OUTPATIENT
Start: 2023-04-18 | End: 2023-04-19

## 2023-04-18 RX ADMIN — Medication 600 MILLIGRAM(S): at 14:45

## 2023-04-18 RX ADMIN — Medication 975 MILLIGRAM(S): at 17:37

## 2023-04-18 RX ADMIN — Medication 975 MILLIGRAM(S): at 18:20

## 2023-04-18 RX ADMIN — Medication 30 MILLIGRAM(S): at 02:34

## 2023-04-18 RX ADMIN — Medication 600 MILLIGRAM(S): at 10:00

## 2023-04-18 RX ADMIN — SIMETHICONE 80 MILLIGRAM(S): 80 TABLET, CHEWABLE ORAL at 09:21

## 2023-04-18 RX ADMIN — Medication 975 MILLIGRAM(S): at 12:30

## 2023-04-18 RX ADMIN — HEPARIN SODIUM 5000 UNIT(S): 5000 INJECTION INTRAVENOUS; SUBCUTANEOUS at 17:37

## 2023-04-18 RX ADMIN — Medication 600 MILLIGRAM(S): at 09:21

## 2023-04-18 RX ADMIN — Medication 975 MILLIGRAM(S): at 11:52

## 2023-04-18 RX ADMIN — Medication 30 MILLIGRAM(S): at 02:04

## 2023-04-18 RX ADMIN — Medication 600 MILLIGRAM(S): at 21:05

## 2023-04-18 RX ADMIN — Medication 975 MILLIGRAM(S): at 06:14

## 2023-04-18 RX ADMIN — Medication 975 MILLIGRAM(S): at 23:17

## 2023-04-18 RX ADMIN — Medication 500 MILLIGRAM(S): at 11:52

## 2023-04-18 RX ADMIN — Medication 1 TABLET(S): at 11:52

## 2023-04-18 RX ADMIN — Medication 600 MILLIGRAM(S): at 15:30

## 2023-04-18 RX ADMIN — Medication 325 MILLIGRAM(S): at 17:37

## 2023-04-18 RX ADMIN — SENNA PLUS 1 TABLET(S): 8.6 TABLET ORAL at 11:53

## 2023-04-18 RX ADMIN — Medication 325 MILLIGRAM(S): at 11:52

## 2023-04-18 RX ADMIN — Medication 600 MILLIGRAM(S): at 20:10

## 2023-04-18 NOTE — PROGRESS NOTE ADULT - SUBJECTIVE AND OBJECTIVE BOX
OB Progress Note:  Delivery, POD#1    S: 34yo POD#1 s/p pLTCS and R paratubal cystectomy. Her pain is well controlled. She is tolerating a regular diet and passing flatus. Denies N/V. Denies CP/SOB/lightheadedness/dizziness. Pt denies sxs of PEC: denies headache, visual changes, RUQ pain, respiratory distress  She is ambulating without difficulty.   Voiding spontaneously.     O:   Vital Signs Last 24 Hrs  T(C): 36.7 (2023 01:00), Max: 36.9 (2023 11:45)  T(F): 98 (2023 01:00), Max: 98.4 (2023 11:45)  HR: 69 (:00) (56 - 95)  BP: 100/57 (:00) (98/64 - 148/60)  BP(mean): 84 (2023 11:45) (77 - 90)  RR: 18 (:00) (18 - 29)  SpO2: 96% (:00) (89% - 100%)    Parameters below as of 2023 01:00  Patient On (Oxygen Delivery Method): room air        Labs:  Blood type: O Positive  Rubella IgG: RPR: Negative                          13.1   7.53 >-----------< 158    ( 17 @ 06:25 )             37.5                  PE:  General: NAD  Abdomen: Mildly distended, appropriately tender, incision c/d/i.  Extremities: No erythema, no pitting edema     OB Progress Note:  Delivery, POD#1    S: 34yo POD#1 s/p pLTCS and R paratubal cystectomy. Her pain is well controlled. She is tolerating a regular diet. Not yet passing flatus. Denies N/V. Denies CP/SOB/lightheadedness/dizziness. Pt denies sxs of PEC: denies headache, visual changes, RUQ pain, respiratory distress  She is ambulating without difficulty.   Voiding spontaneously.     O:   Vital Signs Last 24 Hrs  T(C): 36.7 (2023 01:00), Max: 36.9 (2023 11:45)  T(F): 98 (2023 01:00), Max: 98.4 (2023 11:45)  HR: 69 (:00) (56 - 95)  BP: 100/57 (:00) (98/64 - 148/60)  BP(mean): 84 (2023 11:45) (77 - 90)  RR: 18 (:00) (18 - 29)  SpO2: 96% (:00) (89% - 100%)    Parameters below as of 2023 01:00  Patient On (Oxygen Delivery Method): room air        Labs:  Blood type: O Positive  Rubella IgG: RPR: Negative                          13.1   7.53 >-----------< 158    ( -17 @ 06:25 )             37.5                  PE:  General: NAD  Abdomen: Mildly distended, appropriately tender, incision c/d/i. Psoriasis on mons and bilateral inguinal area that was present prior to C/S per patient.  Extremities: No erythema, no pitting edema

## 2023-04-18 NOTE — CHART NOTE - NSCHARTNOTEFT_GEN_A_CORE
PA Anemia NOTE     POD#1     Vital Signs Last 24 Hrs  T(C): 36.7 (2023 05:44), Max: 36.9 (2023 11:45)  T(F): 98.1 (2023 05:44), Max: 98.4 (2023 11:45)  HR: 77 (2023 05:44) (56 - 89)  BP: 98/55 (2023 05:44) (98/55 - 148/60)  BP(mean): 84 (2023 11:45) (77 - 90)  RR: 18 (2023 05:44) (18 - 29)  SpO2: 97% (2023 05:44) (95% - 98%)    Parameters below as of 2023 05:44  Patient On (Oxygen Delivery Method): room air               9.6    8.69  )-----------( 116      (  @ 06:33 )             27.4                13.1   7.53  )-----------( 158      ( 17 @ 06:25 )             37.5     Assessment:  4 y.o. S/P  C/S POD # 1 with anemia due to acute blood loss-VSS/Asx-not requiring blood tranfusion for Iron Supplementation  Plan:  - Ferrous Sulfate, Colace, Vitamin C supplementation.  - Monitor for signs/symptoms of anemia.     STARR Noriega

## 2023-04-18 NOTE — PROGRESS NOTE ADULT - SUBJECTIVE AND OBJECTIVE BOX
Day 1 of Anesthesia Pain Management Service    SUBJECTIVE: Doing ok  Pain Scale Score:          [X] Refer to charted pain scores    THERAPY:    s/p    100 mcg PF morphine on 4\17\2023       MEDICATIONS  (STANDING):  acetaminophen     Tablet .. 975 milliGRAM(s) Oral <User Schedule>  ascorbic acid 500 milliGRAM(s) Oral daily  diphtheria/tetanus/pertussis (acellular) Vaccine (Adacel) 0.5 milliLiter(s) IntraMuscular once  ferrous    sulfate 325 milliGRAM(s) Oral two times a day  heparin   Injectable 5000 Unit(s) SubCutaneous every 12 hours  ibuprofen  Tablet. 600 milliGRAM(s) Oral every 6 hours  lactated ringers. 1000 milliLiter(s) (125 mL/Hr) IV Continuous <Continuous>  morphine PF Spinal 0.1 milliGRAM(s) IntraThecal. once  prenatal multivitamin 1 Tablet(s) Oral daily  senna 1 Tablet(s) Oral daily    MEDICATIONS  (PRN):  dexAMETHasone  Injectable 4 milliGRAM(s) IV Push every 6 hours PRN Nausea  diphenhydrAMINE 25 milliGRAM(s) Oral every 6 hours PRN Pruritus  lanolin Ointment 1 Application(s) Topical every 6 hours PRN Sore Nipples  magnesium hydroxide Suspension 30 milliLiter(s) Oral two times a day PRN Constipation  nalbuphine Injectable 2.5 milliGRAM(s) IV Push every 6 hours PRN Pruritus  naloxone Injectable 0.1 milliGRAM(s) IV Push every 3 minutes PRN For ANY of the following changes in patient status:  A. Breaths Per Minute LESS THAN 10, B. Oxygen saturation LESS THAN 90%, C. Sedation score of 6 for Stop After: 4 Times  ondansetron Injectable 4 milliGRAM(s) IV Push every 6 hours PRN Nausea  oxyCODONE    IR 10 milliGRAM(s) Oral every 3 hours PRN Severe Pain (7 - 10)  oxyCODONE    IR 5 milliGRAM(s) Oral every 3 hours PRN Moderate Pain (4 - 6)  oxyCODONE    IR 5 milliGRAM(s) Oral once PRN Moderate to Severe Pain (4-10)  oxyCODONE    IR 5 milliGRAM(s) Oral every 3 hours PRN Moderate to Severe Pain (4-10)  simethicone 80 milliGRAM(s) Chew every 4 hours PRN Gas      OBJECTIVE:    Sedation:        	[X] Alert	 [ ] Drowsy	[ ] Arousable      [ ] Asleep       [ ] Unresponsive    Side Effects:	[ ] None 	[ X] Nausea and  Pruritus - resolved  		[ ] Weakness            [ ] Numbness	          [ ] Other:    Vital Signs Last 24 Hrs  T(C): 36.7 (18 Apr 2023 05:44), Max: 36.9 (17 Apr 2023 11:45)  T(F): 98.1 (18 Apr 2023 05:44), Max: 98.4 (17 Apr 2023 11:45)  HR: 77 (18 Apr 2023 05:44) (56 - 89)  BP: 98/55 (18 Apr 2023 05:44) (98/55 - 148/60)  BP(mean): 84 (17 Apr 2023 11:45) (77 - 90)  RR: 18 (18 Apr 2023 05:44) (18 - 29)  SpO2: 97% (18 Apr 2023 05:44) (96% - 98%)    Parameters below as of 18 Apr 2023 05:44  Patient On (Oxygen Delivery Method): room air        ASSESSMENT/ PLAN: Patient seen earlier, documenting done now  [X] Patient transitioned to prn analgesics  [X] Pain management per primary service, pain service to sign off   [X]Documentation and Verification of current medications

## 2023-04-18 NOTE — PROGRESS NOTE ADULT - SUBJECTIVE AND OBJECTIVE BOX
Postpartum Note,  Section   ATTENDING NOTE - Post-operative day 1    Subjective:  The patient feels well. Ambulating without difficulty  Pt is tolerating regular diet. Pain well controlled.  She denies nausea and vomiting.    (   )Saucedo dc'd   awaiting spont void     (   ) Saucedo still in place    ( x ) saucedo dc'd pt already voided  (  ) breastfeeding    She reports normal postpartum bleeding    Physical exam:    Vital Signs Last 24 Hrs  T(C): 36.7 (2023 05:44), Max: 36.9 (2023 11:45)  T(F): 98.1 (2023 05:44), Max: 98.4 (2023 11:45)  HR: 77 (2023 05:44) (56 - 89)  BP: 98/55 (2023 05:44) (98/55 - 148/60)  BP(mean): 84 (2023 11:45) (77 - 90)  RR: 18 (2023 05:44) (18 - 29)  SpO2: 97% (2023 05:44) (95% - 98%)    Parameters below as of 2023 05:44  Patient On (Oxygen Delivery Method): room air        Gen: NAD  Breast: Soft, nontender, not engorged.  Abdomen: Soft, nontender, no distension , firm uterine fundus at umbilicus -2.  Incision: Clean, dry, and intact with steris  Ext: No calf tenderness, no hyper reflexia, (  )trace (  )1+  edema      LABS:                        9.6    8.69  )-----------( 116      ( 2023 06:33 )             27.4                         13.1   7.53  )-----------( 158      ( 2023 06:25 )             37.5     ABO Interpretation: O (23 @ 06:27)  Rh Interpretation: Positive (23 @ 06:27)    Antibody ScreenNegative                Allergies    No Known Allergies    Intolerances      MEDICATIONS  (STANDING):  acetaminophen     Tablet .. 975 milliGRAM(s) Oral <User Schedule>  ascorbic acid 500 milliGRAM(s) Oral daily  diphtheria/tetanus/pertussis (acellular) Vaccine (Adacel) 0.5 milliLiter(s) IntraMuscular once  ferrous    sulfate 325 milliGRAM(s) Oral two times a day  heparin   Injectable 5000 Unit(s) SubCutaneous every 12 hours  ibuprofen  Tablet. 600 milliGRAM(s) Oral every 6 hours  lactated ringers. 1000 milliLiter(s) (125 mL/Hr) IV Continuous <Continuous>  morphine PF Spinal 0.1 milliGRAM(s) IntraThecal. once  prenatal multivitamin 1 Tablet(s) Oral daily  senna 1 Tablet(s) Oral daily    MEDICATIONS  (PRN):  dexAMETHasone  Injectable 4 milliGRAM(s) IV Push every 6 hours PRN Nausea  diphenhydrAMINE 25 milliGRAM(s) Oral every 6 hours PRN Pruritus  lanolin Ointment 1 Application(s) Topical every 6 hours PRN Sore Nipples  magnesium hydroxide Suspension 30 milliLiter(s) Oral two times a day PRN Constipation  nalbuphine Injectable 2.5 milliGRAM(s) IV Push every 6 hours PRN Pruritus  naloxone Injectable 0.1 milliGRAM(s) IV Push every 3 minutes PRN For ANY of the following changes in patient status:  A. Breaths Per Minute LESS THAN 10, B. Oxygen saturation LESS THAN 90%, C. Sedation score of 6 for Stop After: 4 Times  ondansetron Injectable 4 milliGRAM(s) IV Push every 6 hours PRN Nausea  oxyCODONE    IR 10 milliGRAM(s) Oral every 3 hours PRN Severe Pain (7 - 10)  oxyCODONE    IR 5 milliGRAM(s) Oral every 3 hours PRN Moderate Pain (4 - 6)  oxyCODONE    IR 5 milliGRAM(s) Oral once PRN Moderate to Severe Pain (4-10)  oxyCODONE    IR 5 milliGRAM(s) Oral every 3 hours PRN Moderate to Severe Pain (4-10)  simethicone 80 milliGRAM(s) Chew every 4 hours PRN Gas        Assessment and Plan  POD # 1  s/p  section. Stable.  Encourage ambulation  Continue with PCEA/PCA  Regular diet as tolerated  Follow up [facility]    Office [phone]  Dr. Shearer

## 2023-04-19 VITALS
DIASTOLIC BLOOD PRESSURE: 63 MMHG | OXYGEN SATURATION: 98 % | TEMPERATURE: 98 F | RESPIRATION RATE: 18 BRPM | HEART RATE: 71 BPM | SYSTOLIC BLOOD PRESSURE: 101 MMHG

## 2023-04-19 PROCEDURE — 86780 TREPONEMA PALLIDUM: CPT

## 2023-04-19 PROCEDURE — 86769 SARS-COV-2 COVID-19 ANTIBODY: CPT

## 2023-04-19 PROCEDURE — 88304 TISSUE EXAM BY PATHOLOGIST: CPT

## 2023-04-19 PROCEDURE — 85025 COMPLETE CBC W/AUTO DIFF WBC: CPT

## 2023-04-19 PROCEDURE — 86850 RBC ANTIBODY SCREEN: CPT

## 2023-04-19 PROCEDURE — 90707 MMR VACCINE SC: CPT

## 2023-04-19 PROCEDURE — 86901 BLOOD TYPING SEROLOGIC RH(D): CPT

## 2023-04-19 PROCEDURE — 86900 BLOOD TYPING SEROLOGIC ABO: CPT

## 2023-04-19 PROCEDURE — 36415 COLL VENOUS BLD VENIPUNCTURE: CPT

## 2023-04-19 RX ORDER — OXYCODONE HYDROCHLORIDE 5 MG/1
1 TABLET ORAL
Qty: 0 | Refills: 0 | DISCHARGE
Start: 2023-04-19

## 2023-04-19 RX ORDER — ACETAMINOPHEN 500 MG
2 TABLET ORAL
Qty: 0 | Refills: 0 | DISCHARGE
Start: 2023-04-19

## 2023-04-19 RX ORDER — IBUPROFEN 200 MG
1 TABLET ORAL
Qty: 0 | Refills: 0 | DISCHARGE
Start: 2023-04-19

## 2023-04-19 RX ORDER — FERROUS GLUCONATE 100 %
1 POWDER (GRAM) MISCELLANEOUS
Refills: 0 | DISCHARGE

## 2023-04-19 RX ADMIN — Medication 975 MILLIGRAM(S): at 06:30

## 2023-04-19 RX ADMIN — Medication 975 MILLIGRAM(S): at 13:18

## 2023-04-19 RX ADMIN — Medication 600 MILLIGRAM(S): at 03:30

## 2023-04-19 RX ADMIN — Medication 600 MILLIGRAM(S): at 14:50

## 2023-04-19 RX ADMIN — Medication 975 MILLIGRAM(S): at 00:15

## 2023-04-19 RX ADMIN — Medication 975 MILLIGRAM(S): at 05:34

## 2023-04-19 RX ADMIN — HEPARIN SODIUM 5000 UNIT(S): 5000 INJECTION INTRAVENOUS; SUBCUTANEOUS at 05:38

## 2023-04-19 RX ADMIN — Medication 600 MILLIGRAM(S): at 09:45

## 2023-04-19 RX ADMIN — Medication 600 MILLIGRAM(S): at 08:45

## 2023-04-19 RX ADMIN — Medication 325 MILLIGRAM(S): at 08:47

## 2023-04-19 RX ADMIN — Medication 500 MILLIGRAM(S): at 12:18

## 2023-04-19 RX ADMIN — Medication 600 MILLIGRAM(S): at 02:34

## 2023-04-19 RX ADMIN — Medication 1 TABLET(S): at 12:18

## 2023-04-19 RX ADMIN — Medication 600 MILLIGRAM(S): at 14:20

## 2023-04-19 RX ADMIN — SENNA PLUS 1 TABLET(S): 8.6 TABLET ORAL at 12:17

## 2023-04-19 RX ADMIN — Medication 0.5 MILLILITER(S): at 12:14

## 2023-04-19 RX ADMIN — Medication 975 MILLIGRAM(S): at 12:18

## 2023-04-19 NOTE — PROGRESS NOTE ADULT - SUBJECTIVE AND OBJECTIVE BOX
OB Attending Note      S: Pt feeling well, +F, pain controlled    Physical exam:    Vital Signs Last 24 Hrs  T(C): 36.7 (2023 06:40), Max: 36.8 (2023 13:30)  T(F): 98.1 (2023 06:40), Max: 98.3 (2023 13:30)  HR: 71 (2023 06:40) (71 - 72)  BP: 101/63 (2023 06:40) (101/63 - 116/71)  BP(mean): --  RR: 18 (2023 06:40) (18 - 18)  SpO2: 98% (2023 06:40) (98% - 99%)    Parameters below as of 2023 06:40  Patient On (Oxygen Delivery Method): room air      I&O's Summary      Gen: NAD  Breast: Soft, nontender, not engorged.  Abdomen: Soft, nontender, no distension , firm uterine fundus at umbilicus.  Incision: Clean, dry, and intact with tape  labial swelling noted  Scant Lochia  Ext: No calf tenderness    LABS:                        9.6    8.69  )-----------( 116      ( 2023 06:33 )             27.4                         Assessment and Plan  POD # 2s/p  section  Doing well.  Discharge instructions reviewed, pain control with NSAIDS/continue PNVs, nothing per vagina x6 weeks  f/u 6 weeks for pp check, call with issues or concerns  monitor swelling

## 2023-04-19 NOTE — PROGRESS NOTE ADULT - ASSESSMENT
A/P: 32yo POD#1 s/p pLTCS and R paratubal cystectomy, .  Patient is stable and doing well post-operatively.    - Continue regular diet.  - Increase ambulation.  - Continue motrin, tylenol, oxycodone PRN for pain control.  - F/u AM CBC    Lamar Zarate, PGY1
A/P: 32yo POD#1 s/p pLTCS and R paratubal cystectomy, .  Patient is stable and doing well post-operatively.    - Continue regular diet.  - Increase ambulation.  - Continue motrin, tylenol, oxycodone PRN for pain control.  - Continue ferrous sulfate/Vit C for acute blood loss anemia    Lamar Zarate, PGY1

## 2023-04-19 NOTE — PROGRESS NOTE ADULT - SUBJECTIVE AND OBJECTIVE BOX
OB Progress Note: LTCS, POD#2    S: 34yo POD#2 s/p LTCS. Pain is well controlled. She is tolerating a regular diet and passing flatus. She is voiding spontaneously, and ambulating without difficulty. Denies CP/SOB. Denies lightheadedness/dizziness. Denies N/V. Denies sxs od PEC: denies headache, visual changes, RUQ pain, respiratory distress    O:  Vitals:  Vital Signs Last 24 Hrs  T(C): 36.7 (18 Apr 2023 21:47), Max: 36.9 (18 Apr 2023 09:40)  T(F): 98 (18 Apr 2023 21:47), Max: 98.4 (18 Apr 2023 09:40)  HR: 71 (18 Apr 2023 21:47) (71 - 86)  BP: 116/71 (18 Apr 2023 21:47) (106/67 - 116/71)  BP(mean): --  RR: 18 (18 Apr 2023 21:47) (18 - 18)  SpO2: 99% (18 Apr 2023 21:47) (97% - 99%)    Parameters below as of 18 Apr 2023 21:47  Patient On (Oxygen Delivery Method): room air        MEDICATIONS  (STANDING):  acetaminophen     Tablet .. 975 milliGRAM(s) Oral <User Schedule>  ascorbic acid 500 milliGRAM(s) Oral daily  diphtheria/tetanus/pertussis (acellular) Vaccine (Adacel) 0.5 milliLiter(s) IntraMuscular once  ferrous    sulfate 325 milliGRAM(s) Oral two times a day  heparin   Injectable 5000 Unit(s) SubCutaneous every 12 hours  ibuprofen  Tablet. 600 milliGRAM(s) Oral every 6 hours  lactated ringers. 1000 milliLiter(s) (125 mL/Hr) IV Continuous <Continuous>  measles/mumps/rubella Vaccine 0.5 milliLiter(s) SubCutaneous once  prenatal multivitamin 1 Tablet(s) Oral daily  senna 1 Tablet(s) Oral daily      MEDICATIONS  (PRN):  diphenhydrAMINE 25 milliGRAM(s) Oral every 6 hours PRN Pruritus  lanolin Ointment 1 Application(s) Topical every 6 hours PRN Sore Nipples  magnesium hydroxide Suspension 30 milliLiter(s) Oral two times a day PRN Constipation  oxyCODONE    IR 5 milliGRAM(s) Oral once PRN Moderate to Severe Pain (4-10)  oxyCODONE    IR 5 milliGRAM(s) Oral every 3 hours PRN Moderate to Severe Pain (4-10)  simethicone 80 milliGRAM(s) Chew every 4 hours PRN Gas      Labs:  Blood type: O Positive  Rubella IgG: RPR: Negative                          9.6<L>   8.69 >-----------< 116<L>    ( 04-18 @ 06:33 )             27.4<L>                        13.1   7.53 >-----------< 158    ( 04-17 @ 06:25 )             37.5                  PE:  General: NAD  Abdomen: Soft, appropriately tender, incision c/d/i.  Psoriasis on mons and bilateral inguinal area that was present prior to C/S per patient.  Vag: lochia wnl, bilateral swelling of labia with no induration  Extremities: No erythema, no pitting edema

## 2023-04-25 LAB — SURGICAL PATHOLOGY STUDY: SIGNIFICANT CHANGE UP

## 2024-03-07 ENCOUNTER — NON-APPOINTMENT (OUTPATIENT)
Age: 34
End: 2024-03-07

## 2024-06-11 PROBLEM — Z14.8 GENETIC CARRIER OF OTHER DISEASE: Chronic | Status: ACTIVE | Noted: 2023-04-03

## 2024-06-11 PROBLEM — Z86.19 PERSONAL HISTORY OF OTHER INFECTIOUS AND PARASITIC DISEASES: Chronic | Status: ACTIVE | Noted: 2023-04-03

## 2024-09-05 ENCOUNTER — APPOINTMENT (OUTPATIENT)
Dept: OTOLARYNGOLOGY | Facility: CLINIC | Age: 34
End: 2024-09-05

## 2024-09-27 ENCOUNTER — APPOINTMENT (OUTPATIENT)
Dept: RADIOLOGY | Facility: HOSPITAL | Age: 34
End: 2024-09-27

## 2024-10-02 NOTE — OB PST NOTE - NSANTHAGERD_ENT_A_CORE
Post-Op Assessment Note    CV Status:  Stable  Pain Score: 0    Pain management: adequate       Mental Status:  Sleepy   Hydration Status:  Euvolemic   PONV Controlled:  Controlled   Airway Patency:  Patent     Post Op Vitals Reviewed: Yes    No anethesia notable event occurred.    Staff: Anesthesiologist, CRNA               /80 (10/02/24 1034)    Temp      Pulse 92 (10/02/24 1034)   Resp 12 (10/02/24 1034)    SpO2 99 % (10/02/24 1034)       No

## 2024-10-25 ENCOUNTER — APPOINTMENT (OUTPATIENT)
Dept: RADIOLOGY | Facility: HOSPITAL | Age: 34
End: 2024-10-25

## 2024-10-25 ENCOUNTER — OUTPATIENT (OUTPATIENT)
Dept: OUTPATIENT SERVICES | Facility: HOSPITAL | Age: 34
LOS: 1 days | End: 2024-10-25
Payer: COMMERCIAL

## 2024-10-25 DIAGNOSIS — Z98.890 OTHER SPECIFIED POSTPROCEDURAL STATES: Chronic | ICD-10-CM

## 2024-10-25 DIAGNOSIS — N97.9 FEMALE INFERTILITY, UNSPECIFIED: ICD-10-CM

## 2024-10-25 PROCEDURE — 58340 CATHETER FOR HYSTEROGRAPHY: CPT

## 2024-10-25 PROCEDURE — 74740 X-RAY FEMALE GENITAL TRACT: CPT | Mod: 26

## 2024-10-25 PROCEDURE — 74740 X-RAY FEMALE GENITAL TRACT: CPT

## 2024-11-07 ENCOUNTER — TRANSCRIPTION ENCOUNTER (OUTPATIENT)
Age: 34
End: 2024-11-07

## 2025-01-17 ENCOUNTER — EMERGENCY (EMERGENCY)
Facility: HOSPITAL | Age: 35
LOS: 1 days | Discharge: HOME | End: 2025-01-17
Attending: EMERGENCY MEDICINE
Payer: COMMERCIAL

## 2025-01-17 VITALS
WEIGHT: 134.92 LBS | HEART RATE: 78 BPM | RESPIRATION RATE: 18 BRPM | OXYGEN SATURATION: 98 % | HEIGHT: 67 IN | TEMPERATURE: 98 F | SYSTOLIC BLOOD PRESSURE: 120 MMHG | DIASTOLIC BLOOD PRESSURE: 84 MMHG

## 2025-01-17 DIAGNOSIS — Z98.890 OTHER SPECIFIED POSTPROCEDURAL STATES: Chronic | ICD-10-CM

## 2025-01-17 LAB
BASOPHILS # BLD AUTO: 0.04 K/UL — SIGNIFICANT CHANGE UP (ref 0–0.2)
BASOPHILS NFR BLD AUTO: 0.6 % — SIGNIFICANT CHANGE UP (ref 0–2)
BLD GP AB SCN SERPL QL: NEGATIVE — SIGNIFICANT CHANGE UP
EOSINOPHIL # BLD AUTO: 0.15 K/UL — SIGNIFICANT CHANGE UP (ref 0–0.5)
EOSINOPHIL NFR BLD AUTO: 2.1 % — SIGNIFICANT CHANGE UP (ref 0–6)
HCT VFR BLD CALC: 36.9 % — SIGNIFICANT CHANGE UP (ref 34.5–45)
HGB BLD-MCNC: 12.5 G/DL — SIGNIFICANT CHANGE UP (ref 11.5–15.5)
IMM GRANULOCYTES NFR BLD AUTO: 0.1 % — SIGNIFICANT CHANGE UP (ref 0–0.9)
LYMPHOCYTES # BLD AUTO: 2.8 K/UL — SIGNIFICANT CHANGE UP (ref 1–3.3)
LYMPHOCYTES # BLD AUTO: 39.8 % — SIGNIFICANT CHANGE UP (ref 13–44)
MCHC RBC-ENTMCNC: 31 PG — SIGNIFICANT CHANGE UP (ref 27–34)
MCHC RBC-ENTMCNC: 33.9 G/DL — SIGNIFICANT CHANGE UP (ref 32–36)
MCV RBC AUTO: 91.6 FL — SIGNIFICANT CHANGE UP (ref 80–100)
MONOCYTES # BLD AUTO: 0.61 K/UL — SIGNIFICANT CHANGE UP (ref 0–0.9)
MONOCYTES NFR BLD AUTO: 8.7 % — SIGNIFICANT CHANGE UP (ref 2–14)
NEUTROPHILS # BLD AUTO: 3.42 K/UL — SIGNIFICANT CHANGE UP (ref 1.8–7.4)
NEUTROPHILS NFR BLD AUTO: 48.7 % — SIGNIFICANT CHANGE UP (ref 43–77)
NRBC # BLD: 0 /100 WBCS — SIGNIFICANT CHANGE UP (ref 0–0)
PLATELET # BLD AUTO: 257 K/UL — SIGNIFICANT CHANGE UP (ref 150–400)
RBC # BLD: 4.03 M/UL — SIGNIFICANT CHANGE UP (ref 3.8–5.2)
RBC # FLD: 11.6 % — SIGNIFICANT CHANGE UP (ref 10.3–14.5)
RH IG SCN BLD-IMP: POSITIVE — SIGNIFICANT CHANGE UP
WBC # BLD: 7.03 K/UL — SIGNIFICANT CHANGE UP (ref 3.8–10.5)
WBC # FLD AUTO: 7.03 K/UL — SIGNIFICANT CHANGE UP (ref 3.8–10.5)

## 2025-01-17 PROCEDURE — 99284 EMERGENCY DEPT VISIT MOD MDM: CPT

## 2025-01-17 NOTE — ED PROVIDER NOTE - PATIENT PORTAL LINK FT
You can access the FollowMyHealth Patient Portal offered by [facility] by registering at the following website: [email or web address]. By joining [employer]’s FollowMyHealth portal, you will also be able to view your health information using other applications (apps) compatible with our system.

## 2025-01-17 NOTE — ED PROVIDER NOTE - NSFOLLOWUPINSTRUCTIONS_ED_ALL_ED_FT
You were seen in the ED for your vaginal bleeding.     Your labs and ultrasound do not show any acutely concerning findings at this time.     Your were seen by the Ob team here.     Please follow up with your Ob in the office on 1/27 for a repeat Ultrasound.     You have experienced vaginal bleeding during your first trimester of pregnancy. While this can be frightening, it is important to remember that some bleeding in early pregnancy is common. However, it is crucial to follow up with your doctor for proper evaluation and monitoring. These instructions are for informational purposes only and do not replace medical advice from your healthcare provider.    Diagnosis: The cause of your bleeding may or may not have been determined at this time. Possible causes include:    Implantation Bleeding: Light spotting that occurs around the time of expected implantation (6-12 days after conception).  Threatened Miscarriage: Bleeding with a closed cervix. The pregnancy may or may not be viable.  Miscarriage (in progress or complete): Bleeding with cramping and an open cervix.  Ectopic Pregnancy: A pregnancy implanted outside the uterus, often in the fallopian tube. This is a serious medical condition.  Subchorionic Hematoma: A collection of blood between the placenta and the uterine wall.  Cervical changes: Increased blood flow to the cervix can cause bleeding, especially after intercourse.  Infection: Infections of the cervix or vagina can cause bleeding.  Follow-up Care:    Contact your doctor immediately if:  Bleeding becomes heavier than a normal period.  You pass clots larger than a golf ball.  You experience severe abdominal pain or cramping.  You have a fever or chills.  You feel faint or dizzy.  You have shoulder pain, especially if accompanied by abdominal pain (could indicate a ruptured ectopic pregnancy).  Keep your follow-up appointment: Even if your bleeding stops, it is essential to attend your scheduled follow-up appointment so your doctor can monitor your pregnancy.  Ultrasound: You may have had an ultrasound or will be scheduled for one to assess the viability of the pregnancy and identify any potential causes of bleeding.  Activity and Restrictions:    Rest: Take it easy and avoid strenuous activities. Bed rest is generally not recommended unless specifically advised by your doctor.  Avoid sexual intercourse: Refrain from sexual intercourse until your doctor advises it is safe.  No douching or tampons: Do not use tampons or douche. Use sanitary pads to monitor the amount and color of bleeding.  Avoid heavy lifting: Avoid lifting heavy objects.  Medications:    Prenatal vitamins: Continue taking your prenatal vitamins as prescribed.  Other medications: Do not take any medications, including over-the-counter pain relievers, without first consulting your doctor.  Emotional Support:    Seek support: Vaginal bleeding can be emotionally distressing. Talk to your partner, family, friends, or a counselor for support. Your healthcare provider can also provide resources for emotional support.  Important Note: These instructions are general guidelines. Your doctor may provide additional or different instructions based on your individual situation. Do not hesitate to contact your doctor if you have any questions or concerns. It is crucial to follow your doctor's advice closely for the best possible outcome for you and your pregnancy.

## 2025-01-17 NOTE — ED PROVIDER NOTE - PROGRESS NOTE DETAILS
Martin Byrd PGY3:  Pt was seen by OB. They feel her US is more consistent with an early IUP especially with her uptrending HCG. Pt will follow up in the Ob Office on 1/27 for repeat US. Reviewed return precautions. pt is comfortable with plan.

## 2025-01-17 NOTE — ED PROVIDER NOTE - OBJECTIVE STATEMENT
Attendin-year-old female with last menstrual period of 12 8 who is approximately 5 weeks pregnant presents with vaginal bleeding and pelvic cramping since last night.  Patient is G3, P1.  She states that the bleeding is a little bit less today.  She does have a history of 1 miscarriage with her first pregnancy.  no lightheadedness or shortness of breath.  OB: Dr. Shearer.  HCG was approx 117 on 1/10.

## 2025-01-17 NOTE — ED PROVIDER NOTE - CLINICAL SUMMARY MEDICAL DECISION MAKING FREE TEXT BOX
Vaginal bleeding and cramping patient is approximately 5 weeks pregnant.  There is a concern for ectopic pregnancy versus miscarriage versus early pregnancy with bleeding.  Will get a pelvic ultrasound and hCG to screen for ectopic pregnancy.  Will reassess

## 2025-01-18 VITALS
OXYGEN SATURATION: 97 % | DIASTOLIC BLOOD PRESSURE: 73 MMHG | TEMPERATURE: 98 F | HEART RATE: 74 BPM | SYSTOLIC BLOOD PRESSURE: 108 MMHG | RESPIRATION RATE: 18 BRPM

## 2025-01-18 LAB
ALBUMIN SERPL ELPH-MCNC: 4.3 G/DL — SIGNIFICANT CHANGE UP (ref 3.3–5)
ALP SERPL-CCNC: 62 U/L — SIGNIFICANT CHANGE UP (ref 40–120)
ALT FLD-CCNC: 12 U/L — SIGNIFICANT CHANGE UP (ref 10–45)
ANION GAP SERPL CALC-SCNC: 11 MMOL/L — SIGNIFICANT CHANGE UP (ref 5–17)
APPEARANCE UR: CLEAR — SIGNIFICANT CHANGE UP
AST SERPL-CCNC: 15 U/L — SIGNIFICANT CHANGE UP (ref 10–40)
BACTERIA # UR AUTO: NEGATIVE /HPF — SIGNIFICANT CHANGE UP
BILIRUB SERPL-MCNC: 0.3 MG/DL — SIGNIFICANT CHANGE UP (ref 0.2–1.2)
BILIRUB UR-MCNC: NEGATIVE — SIGNIFICANT CHANGE UP
BUN SERPL-MCNC: 11 MG/DL — SIGNIFICANT CHANGE UP (ref 7–23)
CALCIUM SERPL-MCNC: 9.1 MG/DL — SIGNIFICANT CHANGE UP (ref 8.4–10.5)
CAST: 0 /LPF — SIGNIFICANT CHANGE UP (ref 0–4)
CHLORIDE SERPL-SCNC: 104 MMOL/L — SIGNIFICANT CHANGE UP (ref 96–108)
CO2 SERPL-SCNC: 23 MMOL/L — SIGNIFICANT CHANGE UP (ref 22–31)
COLOR SPEC: YELLOW — SIGNIFICANT CHANGE UP
CREAT SERPL-MCNC: 0.51 MG/DL — SIGNIFICANT CHANGE UP (ref 0.5–1.3)
DIFF PNL FLD: ABNORMAL
EGFR: 126 ML/MIN/1.73M2 — SIGNIFICANT CHANGE UP
GLUCOSE SERPL-MCNC: 123 MG/DL — HIGH (ref 70–99)
GLUCOSE UR QL: NEGATIVE MG/DL — SIGNIFICANT CHANGE UP
HCG SERPL-ACNC: 988.6 MIU/ML — HIGH
KETONES UR-MCNC: NEGATIVE MG/DL — SIGNIFICANT CHANGE UP
LEUKOCYTE ESTERASE UR-ACNC: NEGATIVE — SIGNIFICANT CHANGE UP
NITRITE UR-MCNC: NEGATIVE — SIGNIFICANT CHANGE UP
PH UR: 7 — SIGNIFICANT CHANGE UP (ref 5–8)
POTASSIUM SERPL-MCNC: 3.7 MMOL/L — SIGNIFICANT CHANGE UP (ref 3.5–5.3)
POTASSIUM SERPL-SCNC: 3.7 MMOL/L — SIGNIFICANT CHANGE UP (ref 3.5–5.3)
PROT SERPL-MCNC: 6.9 G/DL — SIGNIFICANT CHANGE UP (ref 6–8.3)
PROT UR-MCNC: NEGATIVE MG/DL — SIGNIFICANT CHANGE UP
RBC CASTS # UR COMP ASSIST: 3 /HPF — SIGNIFICANT CHANGE UP (ref 0–4)
REVIEW: SIGNIFICANT CHANGE UP
SODIUM SERPL-SCNC: 138 MMOL/L — SIGNIFICANT CHANGE UP (ref 135–145)
SP GR SPEC: 1.01 — SIGNIFICANT CHANGE UP (ref 1–1.03)
SQUAMOUS # UR AUTO: 1 /HPF — SIGNIFICANT CHANGE UP (ref 0–5)
UROBILINOGEN FLD QL: 0.2 MG/DL — SIGNIFICANT CHANGE UP (ref 0.2–1)
WBC UR QL: 1 /HPF — SIGNIFICANT CHANGE UP (ref 0–5)

## 2025-01-18 PROCEDURE — 81001 URINALYSIS AUTO W/SCOPE: CPT

## 2025-01-18 PROCEDURE — 93975 VASCULAR STUDY: CPT

## 2025-01-18 PROCEDURE — 86900 BLOOD TYPING SEROLOGIC ABO: CPT

## 2025-01-18 PROCEDURE — 80053 COMPREHEN METABOLIC PANEL: CPT

## 2025-01-18 PROCEDURE — 99285 EMERGENCY DEPT VISIT HI MDM: CPT | Mod: 25

## 2025-01-18 PROCEDURE — 87077 CULTURE AEROBIC IDENTIFY: CPT

## 2025-01-18 PROCEDURE — 76817 TRANSVAGINAL US OBSTETRIC: CPT

## 2025-01-18 PROCEDURE — 84702 CHORIONIC GONADOTROPIN TEST: CPT

## 2025-01-18 PROCEDURE — 85025 COMPLETE CBC W/AUTO DIFF WBC: CPT

## 2025-01-18 PROCEDURE — 86850 RBC ANTIBODY SCREEN: CPT

## 2025-01-18 PROCEDURE — 93975 VASCULAR STUDY: CPT | Mod: 26

## 2025-01-18 PROCEDURE — 87086 URINE CULTURE/COLONY COUNT: CPT

## 2025-01-18 PROCEDURE — 86901 BLOOD TYPING SEROLOGIC RH(D): CPT

## 2025-01-18 PROCEDURE — 36000 PLACE NEEDLE IN VEIN: CPT

## 2025-01-18 PROCEDURE — 76817 TRANSVAGINAL US OBSTETRIC: CPT | Mod: 26

## 2025-01-18 NOTE — ED ADULT NURSE NOTE - OBJECTIVE STATEMENT
33 yo Female  presents to the ER with report of abd pain and vaginal bleeding (soaking 1pad/hr). Upon assessment, pt is A&OX4, speaking in full coherent sentences, denies chest pain or SOB, abd soft and nondistended upon palpation, pt moving upper and lower extremities without difficulty. IV placed. Labs sent. Pt afebrile orally. Denies recent sick or ill contacts. Bed locked in lowest position, safety and comfort measures maintained.

## 2025-01-18 NOTE — CONSULT NOTE ADULT - ASSESSMENT
34y  at 5w5d (LMP ) with vaginal spotting x2 days. TVUS shows intrauterine sac; no yolk sac, normal bilateral adnexa. bHCG 17 () -> 116 () -> 988.6 ()  Hemodynamically stable in the ED with nontender abdominal exam.    Differential includes early IUP vs. ectopic pregnancy. bHCG trend favors early IUP.    - Patient to follow up in the office with Dr. Kelly on  for repeat TVUS.   - Rh type: O+. No need for rhogam at this time.   - Ectopic precautions reviewed with patient.  Discussed with patient importance of follow up for b-HCG given unknown pregnancy location at this time.  Patient expressed understanding.  All questions and concerns addressed to patient's apparent satisfaction.    - Patient stable for d/c home from GYN perspective.  Primary management per ED team.      d/w Attending Dr. Leticia Caba, PGY-2

## 2025-01-18 NOTE — CONSULT NOTE ADULT - SUBJECTIVE AND OBJECTIVE BOX
R2 GYNECOLOGY CONSULT NOTE    HPI    34y  at 5w5d (LMP ) presents with 2 days of vaginal spotting in the setting of known pregnancy.  Has been getting bHCG with her office over the past 1 week. bHCG 17 () -> 116 (). Has not yet had her first prenatal visit.    Reports episode of mild midline cramping which has now spontaneously resolved. Currently denying any abdominal pain upon my evaluation.  Denies lightheadedness, dizziness, chest pain, SOB, palpitations, nausea, vomiting, fevers, chills, urinary complaints, constipation, diarrhea    – OBHx: C/s (), SAB ()  – GynHx: Hx of trichomonas (treated). Hx of abnormal pap smears s/p benign colpo (recent pap smear WNL). Fibroids s/p D&C hysteroscopic myomectomy (). Denies ovarian cysts.  – PMH: denies HTN, DM, asthma, cardiac disease, thyroid disorders, Hx of blood clots, bleeding disorders  – PSH: D&C hysteroscopic myomectomy ()  – Social: denies EtOH, smoking, recreational drug use  – Meds: PNV, Levothyroxine 25mcg  – Allergies: NKDA    Objective  – VS  T(C): 36.8 (25 @ 23:06)  HR: 79 (25 @ 23:06)  BP: 118/87 (25 @ 23:06)  RR: 19 (25 @ 23:06)  SpO2: 99% (25 @ 23:06)  – PE:   General: Resting comfortably, NAD  CV: Well perfused  Pulm: Nonlabored breathing  Abd: Soft, nondistended. Nontender to deep palpation. No rebound or guarding.  Extr: No LE swelling.    LABS:                        12.5   7.03  )-----------( 257      ( 2025 23:14 )             36.9         138  |  104  |  11  ----------------------------<  123[H]  3.7   |  23  |  0.51    Ca    9.1      2025 23:14    TPro  6.9  /  Alb  4.3  /  TBili  0.3  /  DBili  x   /  AST  15  /  ALT  12  /  AlkPhos  62        Urinalysis Basic - ( 2025 23:15 )    Color: Yellow / Appearance: Clear / S.014 / pH: x  Gluc: x / Ketone: Negative mg/dL  / Bili: Negative / Urobili: 0.2 mg/dL   Blood: x / Protein: Negative mg/dL / Nitrite: Negative   Leuk Esterase: Negative / RBC: 3 /HPF / WBC 1 /HPF   Sq Epi: x / Non Sq Epi: 1 /HPF / Bacteria: Negative /HPF        Blood Type: O Positive      RADIOLOGY & ADDITIONAL STUDIES:  ACC: [id number] EXAM:  US DPLX PELVIC   ORDERED BY: ADAM EGAN     ACC: [id number] EXAM:  US OB TRANSVAGINAL   ORDERED BY: ADAM EGAN     PROCEDURE DATE:  2025          INTERPRETATION:  CLINICAL INFORMATION: Vaginal bleeding and cramps.    LMP: 2024    Estimated Gestational Age by LMP: 5 weeks 5 days    COMPARISON: None available.    TECHNIQUE: Endovaginal and transabdominal pelvic sonogram. Color and   Spectral Doppler was performed.    FINDINGS:  Uterus: 8.4 x 4.5 x 5.8 cm. An endometrial cystic structure with a mean   sac size of 3 mm. No fetal heart rate or yolk sac are visualized. Trace   echogenic material within the endometrial canal may represent blood   products.    Right ovary: 2.1 x 2.8 x 3.5 cm. Within normal limits. Normal arterial   and venous waveforms.  Left ovary: 2.3 x 1.3 x 1.9 cm. Within normal limits. Normal arterial and   venous waveforms.    Fluid: Trace fluid in the right adnexa.    IMPRESSION:  Fluid collection in the uterus with MSD of <16 mm, which may represent an   early normal IUP, pregnancy failure or a pseudosac with ectopic   pregnancy. Serial hCG and ultrasound are recommended to determine the   significance of these findings.    Trace echogenic material within the endometrial canal may represent   hemorrhage.    --- End of Report ---          LAURI HOROWITZ MD; Resident Radiologist  This document has been electronically signed.  JEISON TILLMAN MD; Attending Radiologist  This document has been electronically signed. 2025  1:28AM

## 2025-01-19 LAB
CULTURE RESULTS: ABNORMAL
SPECIMEN SOURCE: SIGNIFICANT CHANGE UP

## 2025-01-27 ENCOUNTER — RESULT REVIEW (OUTPATIENT)
Age: 35
End: 2025-01-27

## 2025-01-27 ENCOUNTER — OUTPATIENT (OUTPATIENT)
Dept: EMERGENCY DEPT | Facility: HOSPITAL | Age: 35
LOS: 1 days | End: 2025-01-27
Payer: COMMERCIAL

## 2025-01-27 ENCOUNTER — TRANSCRIPTION ENCOUNTER (OUTPATIENT)
Age: 35
End: 2025-01-27

## 2025-01-27 VITALS
SYSTOLIC BLOOD PRESSURE: 125 MMHG | HEART RATE: 79 BPM | TEMPERATURE: 97 F | RESPIRATION RATE: 16 BRPM | OXYGEN SATURATION: 99 % | DIASTOLIC BLOOD PRESSURE: 58 MMHG

## 2025-01-27 VITALS
SYSTOLIC BLOOD PRESSURE: 106 MMHG | TEMPERATURE: 98 F | HEIGHT: 67 IN | DIASTOLIC BLOOD PRESSURE: 71 MMHG | OXYGEN SATURATION: 99 % | RESPIRATION RATE: 20 BRPM | HEART RATE: 72 BPM | WEIGHT: 136.03 LBS

## 2025-01-27 DIAGNOSIS — O00.90 UNSPECIFIED ECTOPIC PREGNANCY WITHOUT INTRAUTERINE PREGNANCY: ICD-10-CM

## 2025-01-27 DIAGNOSIS — Z86.69 PERSONAL HISTORY OF OTHER DISEASES OF THE NERVOUS SYSTEM AND SENSE ORGANS: ICD-10-CM

## 2025-01-27 DIAGNOSIS — Z98.890 OTHER SPECIFIED POSTPROCEDURAL STATES: Chronic | ICD-10-CM

## 2025-01-27 LAB
ALBUMIN SERPL ELPH-MCNC: 4.4 G/DL — SIGNIFICANT CHANGE UP (ref 3.3–5)
ALP SERPL-CCNC: 57 U/L — SIGNIFICANT CHANGE UP (ref 40–120)
ALT FLD-CCNC: 20 U/L — SIGNIFICANT CHANGE UP (ref 10–45)
ANION GAP SERPL CALC-SCNC: 11 MMOL/L — SIGNIFICANT CHANGE UP (ref 5–17)
APTT BLD: 31.4 SEC — SIGNIFICANT CHANGE UP (ref 24.5–35.6)
AST SERPL-CCNC: 19 U/L — SIGNIFICANT CHANGE UP (ref 10–40)
BASOPHILS # BLD AUTO: 0.02 K/UL — SIGNIFICANT CHANGE UP (ref 0–0.2)
BASOPHILS NFR BLD AUTO: 0.3 % — SIGNIFICANT CHANGE UP (ref 0–2)
BILIRUB SERPL-MCNC: 0.5 MG/DL — SIGNIFICANT CHANGE UP (ref 0.2–1.2)
BUN SERPL-MCNC: 9 MG/DL — SIGNIFICANT CHANGE UP (ref 7–23)
CALCIUM SERPL-MCNC: 9.2 MG/DL — SIGNIFICANT CHANGE UP (ref 8.4–10.5)
CHLORIDE SERPL-SCNC: 103 MMOL/L — SIGNIFICANT CHANGE UP (ref 96–108)
CO2 SERPL-SCNC: 25 MMOL/L — SIGNIFICANT CHANGE UP (ref 22–31)
CREAT SERPL-MCNC: 0.47 MG/DL — LOW (ref 0.5–1.3)
EGFR: 129 ML/MIN/1.73M2 — SIGNIFICANT CHANGE UP
EOSINOPHIL # BLD AUTO: 0.08 K/UL — SIGNIFICANT CHANGE UP (ref 0–0.5)
EOSINOPHIL NFR BLD AUTO: 1.2 % — SIGNIFICANT CHANGE UP (ref 0–6)
GLUCOSE SERPL-MCNC: 91 MG/DL — SIGNIFICANT CHANGE UP (ref 70–99)
HCG SERPL-ACNC: 2345 MIU/ML — HIGH
HCT VFR BLD CALC: 38.3 % — SIGNIFICANT CHANGE UP (ref 34.5–45)
HGB BLD-MCNC: 12.9 G/DL — SIGNIFICANT CHANGE UP (ref 11.5–15.5)
IMM GRANULOCYTES NFR BLD AUTO: 0.3 % — SIGNIFICANT CHANGE UP (ref 0–0.9)
INR BLD: 1.23 RATIO — HIGH (ref 0.85–1.16)
LYMPHOCYTES # BLD AUTO: 2 K/UL — SIGNIFICANT CHANGE UP (ref 1–3.3)
LYMPHOCYTES # BLD AUTO: 29.8 % — SIGNIFICANT CHANGE UP (ref 13–44)
MCHC RBC-ENTMCNC: 30.4 PG — SIGNIFICANT CHANGE UP (ref 27–34)
MCHC RBC-ENTMCNC: 33.7 G/DL — SIGNIFICANT CHANGE UP (ref 32–36)
MCV RBC AUTO: 90.1 FL — SIGNIFICANT CHANGE UP (ref 80–100)
MONOCYTES # BLD AUTO: 0.45 K/UL — SIGNIFICANT CHANGE UP (ref 0–0.9)
MONOCYTES NFR BLD AUTO: 6.7 % — SIGNIFICANT CHANGE UP (ref 2–14)
NEUTROPHILS # BLD AUTO: 4.15 K/UL — SIGNIFICANT CHANGE UP (ref 1.8–7.4)
NEUTROPHILS NFR BLD AUTO: 61.7 % — SIGNIFICANT CHANGE UP (ref 43–77)
NRBC # BLD: 0 /100 WBCS — SIGNIFICANT CHANGE UP (ref 0–0)
NRBC BLD-RTO: 0 /100 WBCS — SIGNIFICANT CHANGE UP (ref 0–0)
PLATELET # BLD AUTO: 223 K/UL — SIGNIFICANT CHANGE UP (ref 150–400)
POTASSIUM SERPL-MCNC: 3.9 MMOL/L — SIGNIFICANT CHANGE UP (ref 3.5–5.3)
POTASSIUM SERPL-SCNC: 3.9 MMOL/L — SIGNIFICANT CHANGE UP (ref 3.5–5.3)
PROT SERPL-MCNC: 7.2 G/DL — SIGNIFICANT CHANGE UP (ref 6–8.3)
PROTHROM AB SERPL-ACNC: 14 SEC — HIGH (ref 9.9–13.4)
RBC # BLD: 4.25 M/UL — SIGNIFICANT CHANGE UP (ref 3.8–5.2)
RBC # FLD: 11.7 % — SIGNIFICANT CHANGE UP (ref 10.3–14.5)
SODIUM SERPL-SCNC: 139 MMOL/L — SIGNIFICANT CHANGE UP (ref 135–145)
WBC # BLD: 6.72 K/UL — SIGNIFICANT CHANGE UP (ref 3.8–10.5)
WBC # FLD AUTO: 6.72 K/UL — SIGNIFICANT CHANGE UP (ref 3.8–10.5)

## 2025-01-27 PROCEDURE — 80053 COMPREHEN METABOLIC PANEL: CPT

## 2025-01-27 PROCEDURE — 36415 COLL VENOUS BLD VENIPUNCTURE: CPT

## 2025-01-27 PROCEDURE — 85025 COMPLETE CBC W/AUTO DIFF WBC: CPT

## 2025-01-27 PROCEDURE — 86850 RBC ANTIBODY SCREEN: CPT

## 2025-01-27 PROCEDURE — 85730 THROMBOPLASTIN TIME PARTIAL: CPT

## 2025-01-27 PROCEDURE — 88305 TISSUE EXAM BY PATHOLOGIST: CPT | Mod: 26

## 2025-01-27 PROCEDURE — 84702 CHORIONIC GONADOTROPIN TEST: CPT

## 2025-01-27 PROCEDURE — 76817 TRANSVAGINAL US OBSTETRIC: CPT

## 2025-01-27 PROCEDURE — 86901 BLOOD TYPING SEROLOGIC RH(D): CPT

## 2025-01-27 PROCEDURE — 87077 CULTURE AEROBIC IDENTIFY: CPT

## 2025-01-27 PROCEDURE — 86900 BLOOD TYPING SEROLOGIC ABO: CPT

## 2025-01-27 PROCEDURE — 88305 TISSUE EXAM BY PATHOLOGIST: CPT

## 2025-01-27 PROCEDURE — 99285 EMERGENCY DEPT VISIT HI MDM: CPT

## 2025-01-27 PROCEDURE — 87186 SC STD MICRODIL/AGAR DIL: CPT

## 2025-01-27 PROCEDURE — 87205 SMEAR GRAM STAIN: CPT

## 2025-01-27 PROCEDURE — 85610 PROTHROMBIN TIME: CPT

## 2025-01-27 PROCEDURE — 58661 LAPAROSCOPY REMOVE ADNEXA: CPT | Mod: RT

## 2025-01-27 RX ORDER — ACETAMINOPHEN 160 MG/5ML
975 SUSPENSION ORAL ONCE
Refills: 0 | Status: COMPLETED | OUTPATIENT
Start: 2025-01-27 | End: 2025-01-27

## 2025-01-27 RX ORDER — HYDROMORPHONE HYDROCHLORIDE 4 MG/ML
0.5 INJECTION, SOLUTION INTRAMUSCULAR; INTRAVENOUS; SUBCUTANEOUS
Refills: 0 | Status: DISCONTINUED | OUTPATIENT
Start: 2025-01-27 | End: 2025-01-28

## 2025-01-27 RX ORDER — FAMOTIDINE 10 MG/ML
20 INJECTION INTRAVENOUS DAILY
Refills: 0 | Status: DISCONTINUED | OUTPATIENT
Start: 2025-01-27 | End: 2025-02-11

## 2025-01-27 RX ORDER — OXYCODONE HYDROCHLORIDE 30 MG/1
1 TABLET ORAL
Qty: 5 | Refills: 0
Start: 2025-01-27

## 2025-01-27 RX ORDER — SODIUM CHLORIDE 9 G/ML
1000 INJECTION, SOLUTION INTRAVENOUS
Refills: 0 | Status: DISCONTINUED | OUTPATIENT
Start: 2025-01-27 | End: 2025-01-28

## 2025-01-27 RX ORDER — BACTERIOSTATIC SODIUM CHLORIDE 0.9 %
1000 VIAL (ML) INJECTION ONCE
Refills: 0 | Status: COMPLETED | OUTPATIENT
Start: 2025-01-27 | End: 2025-01-27

## 2025-01-27 RX ORDER — MORPHINE SULFATE 60 MG/1
4 TABLET, FILM COATED, EXTENDED RELEASE ORAL ONCE
Refills: 0 | Status: DISCONTINUED | OUTPATIENT
Start: 2025-01-27 | End: 2025-01-27

## 2025-01-27 RX ORDER — ONDANSETRON 4 MG/1
4 TABLET, ORALLY DISINTEGRATING ORAL ONCE
Refills: 0 | Status: COMPLETED | OUTPATIENT
Start: 2025-01-27 | End: 2025-01-27

## 2025-01-27 RX ADMIN — Medication 80 MILLIGRAM(S): at 19:30

## 2025-01-27 RX ADMIN — Medication 1000 MILLILITER(S): at 14:40

## 2025-01-27 RX ADMIN — ONDANSETRON 4 MILLIGRAM(S): 4 TABLET, ORALLY DISINTEGRATING ORAL at 19:30

## 2025-01-27 RX ADMIN — MORPHINE SULFATE 4 MILLIGRAM(S): 60 TABLET, FILM COATED, EXTENDED RELEASE ORAL at 14:30

## 2025-01-27 RX ADMIN — FAMOTIDINE 20 MILLIGRAM(S): 10 INJECTION INTRAVENOUS at 19:30

## 2025-01-27 RX ADMIN — ONDANSETRON 4 MILLIGRAM(S): 4 TABLET, ORALLY DISINTEGRATING ORAL at 14:30

## 2025-01-27 NOTE — ED PROVIDER NOTE - CLINICAL SUMMARY MEDICAL DECISION MAKING FREE TEXT BOX
attending cecilio  - ruby ttp. suspect ectopic. will keep appy in the back of mind, though less likely given known pregnancy. will obtain sono and consult farhana.

## 2025-01-27 NOTE — BRIEF OPERATIVE NOTE - NSICDXBRIEFPREOP_GEN_ALL_CORE_FT
PRE-OP DIAGNOSIS:  Ruptured right tubal ectopic pregnancy causing hemoperitoneum 27-Jan-2025 17:49:45  Shakira Montes

## 2025-01-27 NOTE — CONSULT NOTE ADULT - SUBJECTIVE AND OBJECTIVE BOX
CC: L ear drainage     HPI: 34y  @6w6d (LMP 12/10) presenting due to concern for right sided ectopic on outpatient ultrasound. ENT called for L ear drainage. Per patient, she was seen a week ago at [facility] for left otitis media on augmentin, improving, however pt with decrease in hearing. Pt has a known 10% perf to the left TM.  PT was diagnosed over the summer with b/l fungal infection s/p tympanostomy tube placement treatment by private ENT. Pt denies any n/v, tinnitus, dizziness, ear pain, congestion, recent URI hx of trauma or recent travel.      MEDICAL & SURGICAL HISTORY:      History of HPV infection      Genetic carrier status      S/P sclerotherapy of varicose veins        Allergies    No Known Allergies    Intolerances      MEDICATIONS  (STANDING):  famotidine    Tablet 20 milliGRAM(s) Oral daily  lactated ringers. 1000 milliLiter(s) (125 mL/Hr) IV Continuous <Continuous>  lactated ringers. 1000 milliLiter(s) (75 mL/Hr) IV Continuous <Continuous>    MEDICATIONS  (PRN):  HYDROmorphone  Injectable 0.5 milliGRAM(s) IV Push every 10 minutes PRN Moderate Pain (4 - 6)      Social History: no tobacco, no etoh     Family history: Pt denies any sign FHx    ROS:   ENT: all negative except as noted in HPI   CV: denies palpitations  Pulm: denies SOB, cough, hemoptysis  GI: denies change in apetite, indigestion, n/v  : denies pertinent urinary symptoms, urgency  Neuro: denies numbness/tingling, loss of sensation  Psych: denies anxiety  MS: denies muscle weakness, instability  Heme: denies easy bruising or bleeding  Endo: denies heat/cold intolerance, excessive sweating  Vascular: denies LE edema    Vital Signs Last 24 Hrs  T(C): 36.2 (2025 18:30), Max: 36.7 (2025 10:19)  T(F): 97.2 (2025 18:30), Max: 98.1 (2025 14:24)  HR: 79 (2025 18:45) (67 - 81)  BP: 112/64 (2025 18:45) (91/78 - 112/64)  BP(mean): 84 (2025 18:45) (75 - 84)  RR: 14 (2025 18:45) (14 - 20)  SpO2: 99% (2025 18:45) (98% - 100%)    Parameters below as of 2025 18:30  Patient On (Oxygen Delivery Method): room air                              12.9   6.72  )-----------( 223      ( 2025 11:18 )             38.3        139  |  103  |  9   ----------------------------<  91  3.9   |  25  |  0.47[L]    Ca    9.2      2025 11:18    TPro  7.2  /  Alb  4.4  /  TBili  0.5  /  DBili  x   /  AST  19  /  ALT  20  /  AlkPhos  57     PT/INR - ( 2025 14:10 )   PT: 14.0 sec;   INR: 1.23 ratio         PTT - ( 2025 14:10 )  PTT:31.4 sec    PHYSICAL EXAM:  Gen: NAD  Skin: No rashes, bruises, or lesions  Head: Normocephalic, Atraumatic  Face: no edema, erythema, or fluctuance. Parotid glands soft without mass  Eyes: no scleral injection  Ears: Right - ear canal clear, TM intact without effusion or erythema. No evidence of any fluid drainage. No mastoid tenderness, erythema, or ear bulging            Left - ear canal with light yellow drainage- cultures obtained , TM appears to have perf no significant erythema.  No mastoid tenderness, erythema, or ear bulging  Nose: Nares bilaterally patent, no discharge  Mouth: No Stridor / Drooling / Trismus.  Mucosa moist, tongue/uvula midline, oropharynx clear  Neck: Flat, supple, no lymphadenopathy, trachea midline, no masses  Lymphatic: No lymphadenopathy  Resp: breathing easily, no stridor  CV: no peripheral edema/cyanosis  GI: nondistended   Peripheral vascular: no JVD or edema  Neuro: facial nerve intact, no facial droop

## 2025-01-27 NOTE — ED CLERICAL - NS ED CLERK NOTE PRE-ARRIVAL INFORMATION; ADDITIONAL PRE-ARRIVAL INFORMATION
CC/Reason For referral: sent from office to r/o ectopic, needs sonogram  Preferred Consultant(if applicable): ob/gyn  Who admits for you (if needed): na  Do you have documents you would like to fax over? no  Would you still like to speak to an ED attending? dr Nino is on call

## 2025-01-27 NOTE — CHART NOTE - NSCHARTNOTEFT_GEN_A_CORE
Patient seen and examined at bedside, recently post-op. No acute complaints at this time. Denies CP, SOB, N/V, fevers, and chills.    Vital Signs Last 24 Hours  T(C): 36.2 (01-27-25 @ 18:30), Max: 36.7 (01-27-25 @ 10:19)  HR: 79 (01-27-25 @ 18:45) (67 - 81)  BP: 112/64 (01-27-25 @ 18:45) (91/78 - 112/64)  RR: 14 (01-27-25 @ 18:45) (14 - 20)  SpO2: 99% (01-27-25 @ 18:45) (98% - 100%)    I&O's Summary    27 Jan 2025 07:01  -  27 Jan 2025 19:25  --------------------------------------------------------  IN: 240 mL / OUT: 0 mL / NET: 240 mL        Physical Exam:  General: NAD  CV: NR, RR, S1, S2, no M/R/G  Lungs: CTA-B  Abdomen: Soft, appropriately tender, non-distended, +BS  Incision: LSC CDI  : no bleeding   Ext: No pain or swelling    Labs:             12.9   6.72  )-----------( 223      ( 01-27 @ 11:18 )             38.3         MEDICATIONS  (STANDING):  famotidine    Tablet 20 milliGRAM(s) Oral daily  lactated ringers. 1000 milliLiter(s) (125 mL/Hr) IV Continuous <Continuous>  lactated ringers. 1000 milliLiter(s) (75 mL/Hr) IV Continuous <Continuous>  ondansetron Injectable 4 milliGRAM(s) IV Push once  simethicone 80 milliGRAM(s) Chew once    MEDICATIONS  (PRN):  HYDROmorphone  Injectable 0.5 milliGRAM(s) IV Push every 10 minutes PRN Moderate Pain (4 - 6)      34yyo s/p TLH, BS, cysto recovering well in acute post-operative state. ENT to see patient for L ear infection. Cleared for dc per GYN.     Neuro: PO pain meds   CV: Hemodynamically stable  Pulm: Ecourage oob/ambulation, incentive spirometer at bedside  GI: Regular Diet   : Voiding spontaneously   Heme: early ambulation  for DVT PPX  ID: afebrile  Endo: no active issues  Dispo: ENT to see. Cleared for discharge per GYN.     D/w Dr. Mica Whalen, PGY2 Patient seen and examined at bedside, recently post-op. No acute complaints at this time. Denies CP, SOB, N/V, fevers, and chills.    Vital Signs Last 24 Hours  T(C): 36.2 (01-27-25 @ 18:30), Max: 36.7 (01-27-25 @ 10:19)  HR: 79 (01-27-25 @ 18:45) (67 - 81)  BP: 112/64 (01-27-25 @ 18:45) (91/78 - 112/64)  RR: 14 (01-27-25 @ 18:45) (14 - 20)  SpO2: 99% (01-27-25 @ 18:45) (98% - 100%)    I&O's Summary    27 Jan 2025 07:01  -  27 Jan 2025 19:25  --------------------------------------------------------  IN: 240 mL / OUT: 0 mL / NET: 240 mL        Physical Exam:  General: NAD  CV: NR, RR, S1, S2, no M/R/G  Lungs: CTA-B  Abdomen: Soft, appropriately tender, non-distended, +BS  Incision: LSC CDI  : no bleeding   Ext: No pain or swelling    Labs:             12.9   6.72  )-----------( 223      ( 01-27 @ 11:18 )             38.3         MEDICATIONS  (STANDING):  famotidine    Tablet 20 milliGRAM(s) Oral daily  lactated ringers. 1000 milliLiter(s) (125 mL/Hr) IV Continuous <Continuous>  lactated ringers. 1000 milliLiter(s) (75 mL/Hr) IV Continuous <Continuous>  ondansetron Injectable 4 milliGRAM(s) IV Push once  simethicone 80 milliGRAM(s) Chew once    MEDICATIONS  (PRN):  HYDROmorphone  Injectable 0.5 milliGRAM(s) IV Push every 10 minutes PRN Moderate Pain (4 - 6)      34yyo s/p LSC RS for R adnexal ectopic recovering well in acute post-operative state. ENT to see patient for L ear infection. Cleared for dc per GYN.     Neuro: PO pain meds   CV: Hemodynamically stable  Pulm: Ecourage oob/ambulation, incentive spirometer at bedside  GI: Regular Diet   : Voiding spontaneously   Heme: early ambulation  for DVT PPX  ID: afebrile  Endo: no active issues  Dispo: ENT to see. Cleared for discharge per GYN.     D/w Dr. Mica Whalen, PGY2

## 2025-01-27 NOTE — ASU DISCHARGE PLAN (ADULT/PEDIATRIC) - CARE PROVIDER_API CALL
Minda Kelly  Obstetrics and Gynecology  [address], [address]  [place] [address]  Phone: [phone]  Fax: [phone]  Follow Up Time: 2 weeks

## 2025-01-27 NOTE — BRIEF OPERATIVE NOTE - NSICDXBRIEFPROCEDURE_GEN_ALL_CORE_FT
Patient awake and alert, seen watching television with parents at bedside. No acute distress noted. Awaiting MD reassessment. Patient to receive NS bolus as ordered. Will continue to monitor. PROCEDURES:  Laparoscopic right salpingectomy for ectopic pregnancy 27-Jan-2025 17:45:14  Shakira Montes

## 2025-01-27 NOTE — ASU DISCHARGE PLAN (ADULT/PEDIATRIC) - FINANCIAL ASSISTANCE
[facility] provides services at a reduced cost to those who are determined to be eligible through [facility]’s financial assistance program. Information regarding [facility]’s financial assistance program can be found by going to [email or web address] or by calling [phone].

## 2025-01-27 NOTE — BRIEF OPERATIVE NOTE - NSICDXBRIEFPOSTOP_GEN_ALL_CORE_FT
POST-OP DIAGNOSIS:  Ruptured right tubal ectopic pregnancy causing hemoperitoneum 27-Jan-2025 17:49:54  Shakira Montes

## 2025-01-27 NOTE — ED ADULT NURSE NOTE - OBJECTIVE STATEMENT
35 yo F presents to ED A+Ox3 accompanied by  c/o vaginal bleeding. Patient states she is 7 weeks pregnant, states 5 days ago she began having intermittent R lower abdominal pain with 2 weeks of vaginal bleeding. States her OB "thought it was fluid" and she was seen in the office today for follow up and was told to come to ED for concern for ectopic pregnancy. States pain is intermittent in RLQ, declines pain medication at this time. Denies fever, chills, chest pain, shortness of breath.  at bedside.

## 2025-01-27 NOTE — H&P ADULT - HISTORY OF PRESENT ILLNESS
TYLER SHAIHD  34y  Female [id number]    HPI:  34y  @6w6d (LMP 12/10) presenting due to concern for right sided ectopic on outpatient ultrasound.    Pt reports she saw her OB this morning in the setting of known PUL, currently undergoing serial bHCG. Adnexal structure seen in office however patient asymptomatic at the time. Patient reports after leaving office she had sudden onset of pain, and was instructed to present to ED for official ultrasound and evaluation.     Currently patient reports pain 2/10 however increases with movement.   Vaginal spotting. Otherwise asymptomatic    Name of GYN Physician: Dr. Kelly    – OBHx: C/s (), SAB ()  – GynHx: Hx of trichomonas (treated). Hx of abnormal pap smears s/p benign colpo (recent pap smear WNL). Fibroids s/p D&C hysteroscopic myomectomy (). Denies ovarian cysts.  – PMH: denies HTN, DM, asthma, cardiac disease, thyroid disorders, Hx of blood clots, bleeding disorders  – PSH: D&C hysteroscopic myomectomy ()  – Social: denies EtOH, smoking, recreational drug use  – Meds: PNV, Levothyroxine 25mcg  – Allergies: NKDA

## 2025-01-27 NOTE — CONSULT NOTE ADULT - CONSULT REQUESTED DATE/TIME
27-Jan-2025 19:34 Quality 127: Diabetic Foot And Ankle Care, Ulcer Prevention - Evaluation Of Footwear: Footwear Evaluation not Performed Quality 337: Tuberculosis Prevention For Psoriasis And Psoriatic Arthritis Patients On A Biological Immune Response Modifier: No documentation of negative or managed positive TB screen Quality 226: Preventive Care And Screening: Tobacco Use: Screening And Cessation Intervention: Tobacco Screening not Performed for Unknown Reasons Quality 137: Melanoma: Continuity Of Care - Recall System: Recall system not utilized, reason not otherwise specified Quality 130: Documentation Of Current Medications In The Medical Record: Current Medications Documented Quality 47: Advance Care Plan: Advance care planning not documented, reason not otherwise specified. Detail Level: Detailed Quality 205 Hiv/Aids: Sexually Transmitted Disease Screening For Chlamydia, Gonorrhea, And Syphilis: Patient refused screening. Quality 93: Acute Otitis Externa (Aoe): Systemic Antimicrobial Therapy - Avoidance Or Inappropriate Use: Physician did NOT prescribing a systemic antibiotic for AOE Quality 265: Biopsy Follow-Up: Biopsy results reviewed, communicated, tracked, and documented Quality 138: Melanoma: Coordination Of Care: Treatment plan not communicated, reason not otherwise specified. Quality 431: Preventive Care And Screening: Unhealthy Alcohol Use - Screening: Unhealthy alcohol use screening not performed, reason not otherwise specified Quality 143: Oncology: Medical And Radiation- Pain Intensity Quantified: Pain severity not assessed.

## 2025-01-27 NOTE — CONSULT NOTE ADULT - ASSESSMENT
34y  @6w6d (LMP 12/10) presenting due to concern for right sided ectopic on outpatient ultrasound. ENT called for L ear drainage. Per patient, she was seen a week ago at [facility] for left otitis media on augmentin, improving, however pt with decrease in hearing. Pt has a known 10% perf to the left TM.  PT was diagnosed over the summer with b/l fungal infection s/p tympanostomy tube placement treatment by private ENT. Pt denies any n/v, tinnitus, dizziness, ear pain, congestion, recent URI hx of trauma or recent travel.

## 2025-01-27 NOTE — ED PROVIDER NOTE - PHYSICAL EXAMINATION
Gen: Alert, NAD  Head: NC, AT   Eyes: PERRL, EOMI, normal lids/conjunctiva  ENT: normal hearing, patent oropharynx without erythema/exudate, uvula midline  Neck: supple, no tenderness, Trachea midline  Pulm: Bilateral BS, normal resp effort, no wheeze/stridor/retractions  CV: RRR, no M/R/G, 2+ radial and dp pulses bl, no edema  Abd: soft, rlq ttp. +BS, no hepatosplenomegaly  Mskel: extremities x4 with normal ROM and no joint effusions. no ctl spine ttp.   Skin: no rash, no bruising   Neuro: AAOx3, no sensory/motor deficits, CN 2-12 intact

## 2025-01-27 NOTE — ED CLERICAL - NS ED CLERK UNITS
Patient was notified of the UA/Micro results at the time of their visit.  Still awaiting the urine culture results.   APER

## 2025-01-27 NOTE — ED ADULT NURSE REASSESSMENT NOTE - NS ED NURSE REASSESS COMMENT FT1
Patient reports worsening R lower abdominal pain, MD aware. Patient undressed and in hospital gown. Belongings and valuables with   at bedside.

## 2025-01-27 NOTE — ASU DISCHARGE PLAN (ADULT/PEDIATRIC) - ASU DC SPECIAL INSTRUCTIONSFT
Postoperative Instructions    For pain control, take the followin. Ibuprofen 600mg every 6 hours, take with food  2. Add 975mg every 6 hours, alternated with ibuprofen  Tylenol and ibuprofen may be obtained over the counter.  3. Oxycodone 5mg every 6 hours as needed for severe pain. A prescription has been sent to your pharmacy.    Return to your regular way of eating.     Resume normal activity as tolerated, but no heavy lifting or strenuous activity for 4 weeks. Complete vaginal rest, no tampons, no douching, no tub bathing, no sexual activities for 4 weeks unless otherwise instructed by your doctor.      No driving while on narcotic pain medication.      Call your doctor with any signs and symptoms of infection such as fever (>100.4 F), chills, nausea or vomiting.  Call your doctor if you're unable to tolerate food or have difficulty urinating.  Call your doctor if you have pain that is not relieved by your prescribed medications. Call your doctor with redness or swelling at the incision site, fluid leakage or wound separation.    Notify your doctor with any other concerns. Follow up with your doctor in 2 weeks for a post-operative appointment.

## 2025-01-27 NOTE — H&P ADULT - NSHPLABSRESULTS_GEN_ALL_CORE
< from: US Transvaginal, OB (25 @ 12:24) >      ACC: [id number] EXAM:  US OB TRANSVAGINAL   ORDERED BY: ROOPA AGUSTO     ABS:                              12.9   6.72  )-----------( 223      ( 2025 11:18 )             38.3         139  |  103  |  9   ----------------------------<  91  3.9   |  25  |  0.47[L]    Ca    9.2      2025 11:18    TPro  7.2  /  Alb  4.4  /  TBili  0.5  /  DBili  x   /  AST  19  /  ALT  20  /  AlkPhos  57      I&O's Detail      Urinalysis Basic - ( 2025 11:18 )    Color: x / Appearance: x / SG: x / pH: x  Gluc: 91 mg/dL / Ketone: x  / Bili: x / Urobili: x   Blood: x / Protein: x / Nitrite: x   Leuk Esterase: x / RBC: x / WBC x   Sq Epi: x / Non Sq Epi: x / Bacteria: x  PROCEDURE DATE:  2025          INTERPRETATION:  CLINICAL INFORMATION: serum B HCG today is 2345.  On   2025 was 989.  Not rising appropriately.    LMP: 2024    COMPARISON: None available.    TECHNIQUE: Transabdominal and transvaginal pelvic ultrasound was   performed.    FINDINGS:  Uterus: S/P . 8.6 cm x 4.6 cm x 5.7 cm.  No intrauterine   gestation. Endometrial cavity contains echogenic material/possible   calcification.    Right ovary: 2.9 cm x 1.9 cm x 3.3 cm. Within normal limits.  Adjacent to the right ovary, there is a heterogeneous    Left ovary: 3.0 cm x 1.7 cm x 2.0 cm. Within normal limits.    Fluid: Small to moderate complex freefluid containing fine echoes.    IMPRESSION: Right sided heterogeneous ectopic pregnancy measuring 3.1 cm   x 2.3 cm x 2.6 cm which likely includes some right fallopian tube   hemorrhage.   No gestational sac in the uterus or right adnexa.    Small to moderate free fluid containing fine echoes.        --- End of Report ---            JELLY CROWLEY MD; Attending Radiologist  This document has been electronically signed. 2025  1:24PM    < end of copied text >

## 2025-01-27 NOTE — H&P ADULT - NSHPPHYSICALEXAM_GEN_ALL_CORE
Vital Signs Last 24 Hrs  T(C): 36.7 (27 Jan 2025 10:19), Max: 36.7 (27 Jan 2025 10:19)  T(F): 98 (27 Jan 2025 10:19), Max: 98 (27 Jan 2025 10:19)  HR: 69 (27 Jan 2025 11:23) (69 - 72)  BP: 103/65 (27 Jan 2025 11:23) (103/65 - 106/71)  BP(mean): 76 (27 Jan 2025 11:23) (76 - 76)  RR: 18 (27 Jan 2025 11:23) (18 - 20)  SpO2: 99% (27 Jan 2025 11:23) (99% - 99%)    Parameters below as of 27 Jan 2025 11:23  Patient On (Oxygen Delivery Method): room air        Physical Exam:   General: sitting comfortably in bed,  at bedside. tearful.   CV: RR   Lungs: normal effort on room air  Abd: Soft, non-distended. +RLQ TTP.  Ext: non-tender b/l, no edema

## 2025-01-27 NOTE — CONSULT NOTE ADULT - PROBLEM SELECTOR RECOMMENDATION 9
- Pt discussed in detail with , plan to   - fungal and bacterial culture obtained   - continue with abx until completion   - flonase to b/l nase bid   - continue follow with private ENT or Pt is to follow up at [facility] clinic in 2 weeks. Call [phone] to make appointment.

## 2025-01-27 NOTE — H&P ADULT - ATTENDING COMMENTS
Patient seen and examined at bedside. Now having worsening RLQ pain and continued vaginal spotting. Denies fever, chills, vomiting.    VSS  Gen: AOx3, NAD  Abd: soft, tender to palpation in RLQ, otherwise nontender, nondistended, no guarding    Imaging: TVUS with moderate free fluid and right ovarian mass suspicious for ectopic pregnancy    Discussed high suspicion for ruptured ectopic pregnancy based on abnormally rising bhcg and sonographic findings as well as new onset pain. Management options discussed, surgical management with laparoscopy and possible salpingectomy advised. Risks including infection, injury and bleeding discussed. All questions answered. Patient amenable to emergent laparoscopy, informed consent signed.    On call to OR  NPO

## 2025-01-27 NOTE — ASU DISCHARGE PLAN (ADULT/PEDIATRIC) - FOLLOW UP APPOINTMENTS
"Patient: Gabi Dye    Procedure Summary     Date:  10/29/18 Room / Location:  [facility] ENDOSCOPY 4 / [facility] ENDOSCOPY    Anesthesia Start:  0923 Anesthesia Stop:  1004    Procedure:  COLONOSCOPY (N/A ) Diagnosis:       Adenomatous polyp of colon, unspecified part of colon      (Adenomatous polyp of colon, unspecified part of colon [D12.6])    Surgeon:  Biju Celis MD Provider:  Geo Fernandez CRNA    Anesthesia Type:  general ASA Status:  3          Anesthesia Type: general  Last vitals  BP   97/49 (10/29/18 1005)   Temp   97.5 °F (36.4 °C) (10/29/18 0805)   Pulse   64 (10/29/18 1005)   Resp   22 (10/29/18 1005)     SpO2   98 % (10/29/18 1005)     Post Anesthesia Care and Evaluation    Patient location during evaluation: PACU  Patient participation: complete - patient participated  Level of consciousness: awake and awake and alert  Pain score: 0  Pain management: adequate  Airway patency: patent  Anesthetic complications: No anesthetic complications    Cardiovascular status: acceptable and stable  Respiratory status: acceptable and unassisted  Hydration status: acceptable    Comments: Blood pressure 97/49, pulse 64, temperature 97.5 °F (36.4 °C), temperature source Temporal Artery , resp. rate 22, height 165.1 cm (65\"), weight 54 kg (119 lb), SpO2 98 %.      "
[facility], [facility]

## 2025-01-27 NOTE — BRIEF OPERATIVE NOTE - OPERATION/FINDINGS
Upon abdominal entry, hemoperitoneum noted to liver edge and collecting in posterior cul-de-sac. Right fallopian tube engorged with apparent ectopic pregnancy. Bilateral ovaries, left fallopian tube, and uterus grossly normal appearing. Surgical sites hemostatic after removal of right fallopian tube.  Upon abdominal entry, hemoperitoneum (200cc) noted to liver edge and collecting in posterior cul-de-sac. Right fallopian tube engorged with apparent ectopic pregnancy. Bilateral ovaries, left fallopian tube, and uterus grossly normal appearing. Surgical sites hemostatic after removal of right fallopian tube.

## 2025-01-27 NOTE — ED PROVIDER NOTE - OBJECTIVE STATEMENT
34F hx psoriatic arthritis not treated and  presents with rlq abd pain and vaginal bleeding x4 days. Pt seen by Dr Sadler her primary OB who suspects ectopic. Pt was send to ED for further testing. She has no nausea or vomiting or fever. She notes pain in the rlq. Nothing taken for pain. Pt has had prev csection. 34F hx psoriatic arthritis not treated and  presents with rlq abd pain and vaginal bleeding x4 days. Pt seen by Dr Sadler her primary OB who suspects ectopic. Pt was send to ED for further testing. She has no nausea or vomiting or fever. She notes pain in the rlq. Nothing taken for pain. Pt has had prev csection.  Describes vaginal bleeding as minimal spotting. No dizziness or syncope. No CP, no SOB.

## 2025-01-27 NOTE — H&P ADULT - ASSESSMENT
A/P: 34y  @6w6d (LMP 12/10) presenting due to concern for right sided ectopic on outpatient ultrasound. Vital signs wnl. H/H stable from prior visit. Inappropriately low increase in bHCG and TVUS with new R adnexal structure with pelvic free fluid concerning for hemoperitoneum. On exam, tenderness in right lower quadrant. Discussed probable diagnostic of ectopic pregnancy. Discussed options for management of ectopic including methotrexate versus surgical intervention. Considering patient is showing signs of rupture including pain and new free fluid on ultrasound, recommend diagnostic laparoscopy. Patient and  amenable and understanding. Currently hemodynamically stable however at risk for decompensation due to possible ongoing bleeding.  - Admit to GYN under Dr. Nino  - Patient added on emergently to OR for EUA, diagnostic laparoscopy, possible right or left salpingectomy, possible right or left salpigooophorectomy  - All questions answered to apparent satisfaction  - LR@125  - NPO  - Please call #[phone] if change in clinical status    Seen with Dr. Mica Montes, PGY-2

## 2025-01-28 LAB
GRAM STN FLD: ABNORMAL
SPECIMEN SOURCE: SIGNIFICANT CHANGE UP

## 2025-01-29 LAB
-  CLINDAMYCIN: SIGNIFICANT CHANGE UP
-  ERYTHROMYCIN: SIGNIFICANT CHANGE UP
-  GENTAMICIN: SIGNIFICANT CHANGE UP
-  OXACILLIN: SIGNIFICANT CHANGE UP
-  PENICILLIN: SIGNIFICANT CHANGE UP
-  RIFAMPIN: SIGNIFICANT CHANGE UP
-  TETRACYCLINE: SIGNIFICANT CHANGE UP
-  TRIMETHOPRIM/SULFAMETHOXAZOLE: SIGNIFICANT CHANGE UP
-  VANCOMYCIN: SIGNIFICANT CHANGE UP
METHOD TYPE: SIGNIFICANT CHANGE UP

## 2025-01-30 LAB — SURGICAL PATHOLOGY STUDY: SIGNIFICANT CHANGE UP

## 2025-02-01 LAB
CULTURE RESULTS: ABNORMAL
ORGANISM # SPEC MICROSCOPIC CNT: ABNORMAL
ORGANISM # SPEC MICROSCOPIC CNT: ABNORMAL
SPECIMEN SOURCE: SIGNIFICANT CHANGE UP

## 2025-03-31 ENCOUNTER — EMERGENCY (EMERGENCY)
Facility: HOSPITAL | Age: 35
LOS: 1 days | Discharge: HOME | End: 2025-03-31
Attending: EMERGENCY MEDICINE
Payer: COMMERCIAL

## 2025-03-31 VITALS
OXYGEN SATURATION: 97 % | SYSTOLIC BLOOD PRESSURE: 104 MMHG | HEART RATE: 79 BPM | TEMPERATURE: 98 F | DIASTOLIC BLOOD PRESSURE: 71 MMHG | RESPIRATION RATE: 18 BRPM

## 2025-03-31 VITALS
RESPIRATION RATE: 20 BRPM | TEMPERATURE: 98 F | HEART RATE: 79 BPM | OXYGEN SATURATION: 98 % | HEIGHT: 67 IN | DIASTOLIC BLOOD PRESSURE: 84 MMHG | SYSTOLIC BLOOD PRESSURE: 125 MMHG | WEIGHT: 136.91 LBS

## 2025-03-31 DIAGNOSIS — Z98.890 OTHER SPECIFIED POSTPROCEDURAL STATES: Chronic | ICD-10-CM

## 2025-03-31 LAB
ALBUMIN SERPL ELPH-MCNC: 4.6 G/DL — SIGNIFICANT CHANGE UP (ref 3.3–5)
ALP SERPL-CCNC: 57 U/L — SIGNIFICANT CHANGE UP (ref 40–120)
ALT FLD-CCNC: 15 U/L — SIGNIFICANT CHANGE UP (ref 10–45)
ANION GAP SERPL CALC-SCNC: 13 MMOL/L — SIGNIFICANT CHANGE UP (ref 5–17)
APPEARANCE UR: CLEAR — SIGNIFICANT CHANGE UP
AST SERPL-CCNC: 17 U/L — SIGNIFICANT CHANGE UP (ref 10–40)
BASOPHILS # BLD AUTO: 0.03 K/UL — SIGNIFICANT CHANGE UP (ref 0–0.2)
BASOPHILS NFR BLD AUTO: 0.6 % — SIGNIFICANT CHANGE UP (ref 0–2)
BILIRUB SERPL-MCNC: 0.4 MG/DL — SIGNIFICANT CHANGE UP (ref 0.2–1.2)
BILIRUB UR-MCNC: NEGATIVE — SIGNIFICANT CHANGE UP
BUN SERPL-MCNC: 10 MG/DL — SIGNIFICANT CHANGE UP (ref 7–23)
CALCIUM SERPL-MCNC: 9 MG/DL — SIGNIFICANT CHANGE UP (ref 8.4–10.5)
CHLORIDE SERPL-SCNC: 104 MMOL/L — SIGNIFICANT CHANGE UP (ref 96–108)
CO2 SERPL-SCNC: 22 MMOL/L — SIGNIFICANT CHANGE UP (ref 22–31)
COLOR SPEC: YELLOW — SIGNIFICANT CHANGE UP
CREAT SERPL-MCNC: 0.54 MG/DL — SIGNIFICANT CHANGE UP (ref 0.5–1.3)
DIFF PNL FLD: NEGATIVE — SIGNIFICANT CHANGE UP
EGFR: 124 ML/MIN/1.73M2 — SIGNIFICANT CHANGE UP
EGFR: 124 ML/MIN/1.73M2 — SIGNIFICANT CHANGE UP
EOSINOPHIL NFR BLD AUTO: 0.7 % — SIGNIFICANT CHANGE UP (ref 0–6)
GLUCOSE SERPL-MCNC: 85 MG/DL — SIGNIFICANT CHANGE UP (ref 70–99)
GLUCOSE UR QL: NEGATIVE MG/DL — SIGNIFICANT CHANGE UP
HCG SERPL-ACNC: 172.8 MIU/ML — HIGH
HCT VFR BLD CALC: 39.5 % — SIGNIFICANT CHANGE UP (ref 34.5–45)
IMM GRANULOCYTES NFR BLD AUTO: 0.2 % — SIGNIFICANT CHANGE UP (ref 0–0.9)
KETONES UR-MCNC: NEGATIVE MG/DL — SIGNIFICANT CHANGE UP
LEUKOCYTE ESTERASE UR-ACNC: NEGATIVE — SIGNIFICANT CHANGE UP
LYMPHOCYTES # BLD AUTO: 2.27 K/UL — SIGNIFICANT CHANGE UP (ref 1–3.3)
LYMPHOCYTES # BLD AUTO: 41.9 % — SIGNIFICANT CHANGE UP (ref 13–44)
MCHC RBC-ENTMCNC: 31.2 PG — SIGNIFICANT CHANGE UP (ref 27–34)
MCHC RBC-ENTMCNC: 34.9 G/DL — SIGNIFICANT CHANGE UP (ref 32–36)
MCV RBC AUTO: 89.2 FL — SIGNIFICANT CHANGE UP (ref 80–100)
MONOCYTES # BLD AUTO: 0.48 K/UL — SIGNIFICANT CHANGE UP (ref 0–0.9)
NEUTROPHILS # BLD AUTO: 2.59 K/UL — SIGNIFICANT CHANGE UP (ref 1.8–7.4)
NEUTROPHILS NFR BLD AUTO: 47.7 % — SIGNIFICANT CHANGE UP (ref 43–77)
NITRITE UR-MCNC: NEGATIVE — SIGNIFICANT CHANGE UP
NRBC BLD AUTO-RTO: 0 /100 WBCS — SIGNIFICANT CHANGE UP (ref 0–0)
PLATELET # BLD AUTO: 228 K/UL — SIGNIFICANT CHANGE UP (ref 150–400)
POTASSIUM SERPL-MCNC: 3.9 MMOL/L — SIGNIFICANT CHANGE UP (ref 3.5–5.3)
POTASSIUM SERPL-SCNC: 3.9 MMOL/L — SIGNIFICANT CHANGE UP (ref 3.5–5.3)
PROT SERPL-MCNC: 7.3 G/DL — SIGNIFICANT CHANGE UP (ref 6–8.3)
PROT UR-MCNC: NEGATIVE MG/DL — SIGNIFICANT CHANGE UP
RBC # BLD: 4.43 M/UL — SIGNIFICANT CHANGE UP (ref 3.8–5.2)
RBC # FLD: 11.8 % — SIGNIFICANT CHANGE UP (ref 10.3–14.5)
SODIUM SERPL-SCNC: 139 MMOL/L — SIGNIFICANT CHANGE UP (ref 135–145)
SP GR SPEC: 1.01 — SIGNIFICANT CHANGE UP (ref 1–1.03)
UROBILINOGEN FLD QL: 0.2 MG/DL — SIGNIFICANT CHANGE UP (ref 0.2–1)
WBC # BLD: 5.42 K/UL — SIGNIFICANT CHANGE UP (ref 3.8–10.5)
WBC # FLD AUTO: 5.42 K/UL — SIGNIFICANT CHANGE UP (ref 3.8–10.5)

## 2025-03-31 PROCEDURE — 86901 BLOOD TYPING SEROLOGIC RH(D): CPT

## 2025-03-31 PROCEDURE — 83690 ASSAY OF LIPASE: CPT

## 2025-03-31 PROCEDURE — 86900 BLOOD TYPING SEROLOGIC ABO: CPT

## 2025-03-31 PROCEDURE — 86850 RBC ANTIBODY SCREEN: CPT

## 2025-03-31 PROCEDURE — 99284 EMERGENCY DEPT VISIT MOD MDM: CPT

## 2025-03-31 PROCEDURE — 84702 CHORIONIC GONADOTROPIN TEST: CPT

## 2025-03-31 PROCEDURE — 81003 URINALYSIS AUTO W/O SCOPE: CPT

## 2025-03-31 PROCEDURE — 80053 COMPREHEN METABOLIC PANEL: CPT

## 2025-03-31 PROCEDURE — 76817 TRANSVAGINAL US OBSTETRIC: CPT

## 2025-03-31 PROCEDURE — 99284 EMERGENCY DEPT VISIT MOD MDM: CPT | Mod: 25

## 2025-03-31 PROCEDURE — 76817 TRANSVAGINAL US OBSTETRIC: CPT | Mod: 26

## 2025-03-31 PROCEDURE — 85025 COMPLETE CBC W/AUTO DIFF WBC: CPT

## 2025-03-31 PROCEDURE — 36415 COLL VENOUS BLD VENIPUNCTURE: CPT

## 2025-03-31 NOTE — ED PROVIDER NOTE - CARE PLAN
1 Principal Discharge DX:	Pregnant and not yet delivered   Principal Discharge DX:	Pregnancy of unknown anatomic location

## 2025-03-31 NOTE — ED PROVIDER NOTE - CLINICAL SUMMARY MEDICAL DECISION MAKING FREE TEXT BOX
- Summary : 35 yr F visited today due to concerns of a potential ectopic pregnancy. Five weeks of pregnancy and Was at her OB/GYN office today when they noticed free fluid around the right ovary sent in for rule out ectopic pregnancy, despite her having no pain or symptoms of etopic pregnancy, such as vaginal bleeding or dizziness.  PE: NAD, MMM, lungs CTA, heart no MRG, no abdominal tenderness, no CVA tenderness, no LE edema   DDx: Must rule out ectopic pregnancy, there is a high likelihood this is normal physiology after her previous tubal removal surgery, UTI  Plan: Plan; CBC CMP UA transvaginal ultrasound frequent reassessments obGYN consultation - Summary : 35 yr F visited today due to concerns of a potential ectopic pregnancy. Five weeks of pregnancy and Was at her OB/GYN office today when they noticed free fluid around the right ovary sent in for rule out ectopic pregnancy, despite her having no pain or symptoms of etopic pregnancy, such as vaginal bleeding or dizziness.  PE: NAD, MMM, lungs CTA, heart no MRG, no abdominal tenderness, no CVA tenderness, no LE edema   DDx: Must rule out ectopic pregnancy, there is a high likelihood this is normal physiology after her previous tubal removal surgery, UTI  Plan: Plan; CBC CMP UA transvaginal ultrasound frequent reassessments obGYN consultation    ZOË Vaughan MD: Agree with resident/ACP MDM, assessment and plan as above.

## 2025-03-31 NOTE — ED PROVIDER NOTE - NSFOLLOWUPCLINICS_GEN_ALL_ED_FT
[facility] Gynecology and Obstetrics  Gynceology/OB  [address] [place] [address]  Phone: [phone]  Fax:

## 2025-03-31 NOTE — ED PROVIDER NOTE - NSFOLLOWUPINSTRUCTIONS_ED_ALL_ED_FT
MAKE IT TO YOUR OBGYN APPOINTMENT       An ectopic pregnancy is the term for a pregnancy that is not in the correct location. This can become a life-threatening emergency if the ectopic pregnancy causes heavy internal or vaginal bleeding. An ectopic pregnancy cannot continue to term and must be managed by your obstetrician. This can cause issues with fertility and future pregnancies.    SEEK IMMEDIATE MEDICAL CARE IF YOU HAVE ANY OF THE FOLLOWING SYMPTOMS: heavy vaginal bleeding, severe low back or abdominal cramps, fever/chills, lightheadedness/dizziness, or fainting.

## 2025-03-31 NOTE — CONSULT NOTE ADULT - SUBJECTIVE AND OBJECTIVE BOX
TYLER SHAHID  35y  Female [id number]    HPI:  35 year old  LMP 25 (EGA 5w) presents to ED from office due to concerns of ovarian ectopic. Patient was following closely with her OB for serial bHCG due to a personal history of a ruptured ectopic pregnancy in 2025. Patient was being seen today for a routine bHCG and scan when they thought they saw an ovarian ectopic so sent her to the ED for further evaluation. In the ED patient denies VB, abdominal pain, lightheadedness, dizziness, chest pain, shortness of breath.      Name of OB Physician: Goyo    POB: C/S x1, MAB x1, Ectopic s/p RS  Pgyn: Hx of trichomonas (treated). Hx of abnormal pap smears s/p benign colpo. +F ibroids, Denies ovarian cysts.  PMHx: Psoriasis  Meds: PNV, Levothyroxine  All: NKDA  Surgeries: D+C Myomectomy, C/S x1      Vital Signs Last 24 Hrs  T(C): 36.6 (31 Mar 2025 17:06), Max: 36.7 (31 Mar 2025 14:12)  T(F): 97.9 (31 Mar 2025 17:06), Max: 98.1 (31 Mar 2025 14:12)  HR: 79 (31 Mar 2025 17:06) (74 - 79)  BP: 104/71 (31 Mar 2025 17:06) (104/70 - 125/84)  BP(mean): --  RR: 18 (31 Mar 2025 17:06) (18 - 20)  SpO2: 97% (31 Mar 2025 17:06) (97% - 99%)    Parameters below as of 31 Mar 2025 17:06  Patient On (Oxygen Delivery Method): room air        Physical Exam:   General: sitting comfortably in bed, NAD   CV: RR S1S2 no m/r/g  Lungs: CTA b/l, good air flow b/l   Abd: Soft, non-tender, non-distended.  Bowel sounds present.    :  No bleeding on pad.    External labia wnl.  Bimanual exam with no cervical motion tenderness, uterus wnl, adnexa non palpable b/l.  Cervix closed Speculum Exam: No active bleeding from os.  Physiologic discharge.    Ext: non-tender b/l, no edema     LABS:                              13.8   5.42  )-----------( 228      ( 31 Mar 2025 11:48 )             39.5         139  |  104  |  10  ----------------------------<  85  3.9   |  22  |  0.54    Ca    9.0      31 Mar 2025 11:48    TPro  7.3  /  Alb  4.6  /  TBili  0.4  /  DBili  x   /  AST  17  /  ALT  15  /  AlkPhos  57      I&O's Detail      Urinalysis Basic - ( 31 Mar 2025 11:48 )    Color: Yellow / Appearance: Clear / S.007 / pH: x  Gluc: 85 mg/dL / Ketone: Negative mg/dL  / Bili: Negative / Urobili: 0.2 mg/dL   Blood: x / Protein: Negative mg/dL / Nitrite: Negative   Leuk Esterase: Negative / RBC: x / WBC x   Sq Epi: x / Non Sq Epi: x / Bacteria: x        RADIOLOGY & ADDITIONAL STUDIES:    ACC: [id number] EXAM:  US OB TRANSVAGINAL   ORDERED BY:  CY GARCIA     PROCEDURE DATE:  2025          INTERPRETATION:  CLINICAL INFORMATION: History of ectopic pregnancy   status post right salpingectomy. Fluid around the right ovary seen on   ultrasound at OB/GYN office. Rule out ectopic. Serum Beta hCG 173 on   3/31/2025. Serum beta hCG level on 3/28/2025 was 84. Serum beta hCG level   on 3/26/2025 was 56. Serum beta-hCG on 3/24/2025 was 41.    LMP: 2025    Estimated Gestational Age by LMP: 4 weeks 6 days    COMPARISON: Transabdominal and transvaginal pelvic ultrasound 2025    TECHNIQUE: Endovaginal and transabdominal pelvic sonogram. Color and   Spectral Doppler was performed.    FINDINGS:  Uterus: Status post , measures 9.3 x 4.8 x 5.8 cm, thickened   endometrium measuring 13 mm with peripheral endometrial echogenic foci at   the fundus likely representing the previously seen small calcifications.   No evidence of intrauterine pregnancy.    Right ovary: 2.0 cm x 1.4 cm x 3.6 cm. Within normal limits.  Left ovary: 3.0 cm x 2.2 cm x 2.9 cm. Within normal limits. Normal   arterial and venous waveforms. Left corpus luteum measuring 1.9 x 1.7 x   1.8 cm.    No extraovarian masses identified.    Fluid: Trace fluid within the cul-de-sac..    IMPRESSION:    No evidence of intrauterine, or ectopic pregnancy. Recommend short-term   follow-up with repeat hCG and transvaginal pelvic sonogram for continued   monitoring.      --- End of Report ---          VIOLETA HUSSEIN MD; Resident Radiologist  This document has been electronically signed.  JELLY CROWLEY MD; Attending Radiologist  This document has been electronically signed. Mar 31 2025  4:21PM

## 2025-03-31 NOTE — ED ADULT TRIAGE NOTE - CHIEF COMPLAINT QUOTE
5w pregnant, sent in by GYN for r/o ectopic due to seeing something abnormal in R ovary on US.   Jan 27th had an ectopic, surgery done here.

## 2025-03-31 NOTE — ED ADULT TRIAGE NOTE - PATIENT ON (OXYGEN DELIVERY METHOD)
Pharmacy Tube Feeding Consult    Medication reviewed for administration by feeding tube and for potential food/drug interactions.    Recommendation: No changes are needed at this time.     Pharmacy will continue to follow as new medications are ordered.    Meera Weiner, P4 Student Pharmacist    
room air

## 2025-03-31 NOTE — CONSULT NOTE ADULT - ASSESSMENT
Assessment  35 year old  LMP 25 (EGA 5w) presents to ED from office due to concerns of ovarian ectopic. In the ED patient vitally and hemodynamically stable, with bHCG being 172 and TVUS showing no intrauterine or extrauterine pregnancy. Patient counseled that this could be early viable pregnancy vs ectopic not yet visualized vs non-viable pregnancy. Difficult to assess at this early gestation. As patient asymptomatic no acute intervention at this time    Plan  -Patient cleared for d/c with close OB follow up  -Patient to f/u with private OB on  for repeat bHCG  -Return precautions given (abdominal pain, VB like soaking through more than a pad an hour > 2 hours)    discussed w Dr Goyo Lu PGY4

## 2025-03-31 NOTE — ED PROVIDER NOTE - OBJECTIVE STATEMENT
- Chief Complaint (CC) : Possible ectopic pregnancy with unconfirmed intrauterine pregnancy.  - History of Present Illness : Devi had an ectopic pregnancy that required surgery on January 27th and is currently approximately five weeks pregnant. An anomaly on the right ovary and accompanying fluid has raised a suspicion of another potential ectopic pregnancy. Last HCG blood count shows progressing numbers from 41, 56 to 84 but no confirmation for intrauterine pregnancy yet. Devi, however, reports feeling fine and appears asymptomatic.  - Past Medical History : Devi has a past medical history of psoriasis and arthritis. She also mentioned a miscarriage in 2021 and an ectopic pregnancy that required the removal of the right fallopian tube.  - Past Surgical History : History of ectopic pregnancy surgery and removal of the right fallopian tube.  - Family History :  - Social History :  - Review of Systems :   No reported symptoms of weight loss, weight gain, loss of appetite, fatigue, malaise, fever, chills, night sweats, or changes in energy levels.  Denies vaginal bleeding denies chest pain denies shortness of breath denies lower abdominal pain denies nausea vomiting        - Medications : Silva, norepinephrine, and prenatal vitamins.

## 2025-03-31 NOTE — ED PROVIDER NOTE - PHYSICAL EXAMINATION
Vital signs reviewed.  CONSTITUTIONAL: breathing  HEAD: Normocephalic; atraumatic  EYES: EOMI, PERRL, no conjunctival injection, no scleral icterus  MOUTH/THROAT:  MMM  NECK: Trachea midline, no JVD  CV: Normal S1, S2; no audible murmurs  RESP: normal work of breathing, CTA b/l  ABD: soft, non-distended; non-tender to palpation   : Deferred  MSK/EXT: no LE edema, no limited ROM  SKIN: No rashes on exposed skin surfaces  NEURO: Moves all extremities spontaneously with no focal deficits, speech is appropriate  PSYCH: calm

## 2025-03-31 NOTE — ED ADULT NURSE NOTE - OBJECTIVE STATEMENT
35 year old female PMH of ectopic pregnancy in January - operated on right fallopian tube at that time at [facility], , 1 live birth/child, presents to the ED sent in from outpatient gyn office to r/o ectopic due to seeing something abnormal in R ovary on US. 20g peripheral IV placed and labs drawn per MD orders. MDs at bedside for assessment and discussion of plan of care. VSS. denies chest pain, sob, ha, n/v/d, abdominal pain, f/c, urinary symptoms, hematuria. Patient undressed and placed into gown, call bell in hand and side rails up with bed in lowest position for safety. blanket provided. Comfort and safety provided.

## 2025-03-31 NOTE — ED PROVIDER NOTE - PATIENT PORTAL LINK FT
You can access the FollowMyHealth Patient Portal offered by [facility] by registering at the following website: [email or web address] By joining [facility] FollowMyHealth portal, you will also be able to view your health information using other applications (apps) compatible with our system.

## 2025-03-31 NOTE — ED ADULT NURSE NOTE - NS ED NURSE IV DC DT
Pt is I c good balance and endurance in all functional ability, therefore d/c pt from the PT program.  If any further needs arise, please reconsult.
31-Mar-2025 17:37

## 2025-06-17 NOTE — OB PST NOTE - NSANTHBMIRD_ENT_A_CORE
[de-identified] : Patient is a 52-year-old female here to evaluate right knee pain. Patient states pain is not related to any specific injury. Patient notes she started experiencing pain after sitting in the car for 10 minutes after going for a walk on a trail last week. Patient notes sharp, throbbing pain that occasionally radiates down the leg into the shin. Patient rates the pain a 9 out of 10. Patient notes pain is most prevalent when walking. Patient notes she can't bend the knee back when the leg is straightened for prolonged periods. Patient notes occasional swelling in the knee. Patient notes difficulty walking Patient uses Advil as needed. Patient notes no prior injury to the knee. No

## (undated) DEVICE — SCOPE WARMER SEAL DISP

## (undated) DEVICE — BLADE SCALPEL SAFETYLOCK #10

## (undated) DEVICE — LIGASURE MARYLAND 5MM X 37CM

## (undated) DEVICE — ENDOCATCH II 15MM

## (undated) DEVICE — MEDICATION LABELS W MARKER

## (undated) DEVICE — TROCAR COVIDIEN MINI STEP 5MM SHORT

## (undated) DEVICE — UTERINE MANIPULATOR CONMED VCARE SM 32MM

## (undated) DEVICE — DRAPE 3/4 SHEET W REINFORCEMENT 56X77"

## (undated) DEVICE — VENODYNE/SCD SLEEVE CALF MEDIUM

## (undated) DEVICE — SUT VICRYL PLUS 0 27" CT-3

## (undated) DEVICE — Device

## (undated) DEVICE — DRSG OPSITE 2.5 X 2"

## (undated) DEVICE — SYR LUER LOK 10CC

## (undated) DEVICE — UTERINE MANIPULATOR CLINICAL INNOVATIONS CLEARVIEW 7CM

## (undated) DEVICE — TUBING FLUID ADMINISTRATION SET PRIM 70"

## (undated) DEVICE — PREP CHLORAPREP HI-LITE ORANGE 26ML

## (undated) DEVICE — UTERINE MANIPULATOR CONMED VCARE MED 34MM

## (undated) DEVICE — GRASPER ENDO DISP 5MMX31CM

## (undated) DEVICE — TROCAR COVIDIEN VERSAONE BLADED FIXATION 11MM STANDARD

## (undated) DEVICE — ENDO DISSECT INST 5MM

## (undated) DEVICE — TUBING STRYKER HYSTEROSCOPY INFLOW OUTFLOW

## (undated) DEVICE — DRAPE MAYO STAND 30"

## (undated) DEVICE — VISITEC 4X4

## (undated) DEVICE — DRSG MASTISOL

## (undated) DEVICE — DRSG STERISTRIPS 0.5 X 4"

## (undated) DEVICE — ENDOCATCH 10MM

## (undated) DEVICE — PACK GYN LAPAROSCOPY

## (undated) DEVICE — NDL HYPO SAFE 18G X 1.5" (PINK)

## (undated) DEVICE — STAPLER COVIDIEN ENDO GIA STANDARD HANDLE

## (undated) DEVICE — MARKING PEN W RULER

## (undated) DEVICE — VALVE YELLOW PORT SEAL PLUS 5MM

## (undated) DEVICE — NDL HYPO SAFE 22G X 1.5" (BLACK)

## (undated) DEVICE — UTERINE MANIPULATOR CONMED VCARE LG 37MM

## (undated) DEVICE — TROCAR COVIDIEN VERSASTEP PLUS 11MM STANDARD

## (undated) DEVICE — TROCAR COVIDIEN VERSASTEP SLEEVE

## (undated) DEVICE — DRAPE 1/2 SHEET 40X57"

## (undated) DEVICE — GOWN TRIMAX LG

## (undated) DEVICE — PREP BETADINE KIT

## (undated) DEVICE — TROCAR COVIDIEN BLUNT TIP HASSAN 10MM

## (undated) DEVICE — PACK LITHOTOMY

## (undated) DEVICE — BLADE SCALPEL SAFETYLOCK #15

## (undated) DEVICE — DRSG DERMABOND 0.7ML

## (undated) DEVICE — NDL COUNTER FOAM AND MAGNET 40-70

## (undated) DEVICE — PRESSURE INFUSOR BAG 1000ML

## (undated) DEVICE — SUT BIOSYN 4-0 18" P-12

## (undated) DEVICE — DRSG TELFA 3 X 8

## (undated) DEVICE — FOLEY TRAY 16FR LF URINE METER SURESTEP

## (undated) DEVICE — UTERINE MANIPULATOR COOPER SURGICAL 5MM 33CM GREEN

## (undated) DEVICE — WARMING BLANKET UPPER ADULT

## (undated) DEVICE — TUBING INSUFFLATION LAP FILTER 10FT

## (undated) DEVICE — DRAPE TOWEL BLUE 17" X 24"

## (undated) DEVICE — STAPLER SKIN VISI-STAT 35 WIDE

## (undated) DEVICE — TROCAR COVIDIEN VERSAONE FIXATION CANNULA 5MM

## (undated) DEVICE — LAP PAD 18 X 18"

## (undated) DEVICE — INSUFFLATION NDL COVIDIEN STEP 14G FOR STEP/VERSASTEP

## (undated) DEVICE — DRAPE INSTRUMENT POUCH 6.75" X 11"

## (undated) DEVICE — TUBING SUCTION 20FT

## (undated) DEVICE — TROCAR COVIDIEN BLUNT TIP HASSAN 10MM STANDARD

## (undated) DEVICE — LIGASURE BLUNT TIP 5MM X 37CM

## (undated) DEVICE — TROCAR APPLIED MEDICAL KII BALLOON BLUNT TIP 12MM X 100MM

## (undated) DEVICE — ELCTR BOVIE PENCIL SMOKE EVACUATION

## (undated) DEVICE — POSITIONER FOAM EGG CRATE ULNAR 2PCS (PINK)

## (undated) DEVICE — ABDOMINAL BINDER XL 9" X 62"-74"

## (undated) DEVICE — TUBING STRYKEFLOW II SUCTION / IRRIGATOR

## (undated) DEVICE — SPECIMEN CONTAINER 100ML

## (undated) DEVICE — GLV 6.5 PROTEXIS (WHITE)

## (undated) DEVICE — DRAPE LIGHT HANDLE COVER (BLUE)

## (undated) DEVICE — SOL IRR POUR H2O 250ML

## (undated) DEVICE — APPLICATOR VISTASEAL LAP DUAL 35CM RIGID

## (undated) DEVICE — TROCAR GELPOINT MINI ADVANCED

## (undated) DEVICE — SOL IRR POUR NS 0.9% 500ML

## (undated) DEVICE — INSUFFLATION NDL COVIDIEN SURGINEEDLE VERESS 120MM

## (undated) DEVICE — DISSECTOR ENDO PEANUT 5MM

## (undated) DEVICE — GLV 6.5 PROTEXIS (BLUE)

## (undated) DEVICE — D HELP - CLEARVIEW CLEARIFY SYSTEM

## (undated) DEVICE — TUBING TUR 2 PRONG

## (undated) DEVICE — PREP CHLOROHEXIDINE 4% 118CC KIT